# Patient Record
Sex: MALE | Race: WHITE | Employment: OTHER | URBAN - METROPOLITAN AREA
[De-identification: names, ages, dates, MRNs, and addresses within clinical notes are randomized per-mention and may not be internally consistent; named-entity substitution may affect disease eponyms.]

---

## 2019-09-12 ENCOUNTER — TRANSFERRED RECORDS (OUTPATIENT)
Dept: HEALTH INFORMATION MANAGEMENT | Facility: CLINIC | Age: 69
End: 2019-09-12

## 2019-09-12 ENCOUNTER — APPOINTMENT (OUTPATIENT)
Age: 69
Setting detail: DERMATOLOGY
End: 2019-09-14

## 2019-09-12 VITALS — WEIGHT: 179 LBS | HEIGHT: 72 IN | RESPIRATION RATE: 14 BRPM

## 2019-09-12 DIAGNOSIS — D485 NEOPLASM OF UNCERTAIN BEHAVIOR OF SKIN: ICD-10-CM

## 2019-09-12 DIAGNOSIS — L57.0 ACTINIC KERATOSIS: ICD-10-CM

## 2019-09-12 PROBLEM — D48.5 NEOPLASM OF UNCERTAIN BEHAVIOR OF SKIN: Status: ACTIVE | Noted: 2019-09-12

## 2019-09-12 PROCEDURE — OTHER PATHOLOGY BILLING: OTHER

## 2019-09-12 PROCEDURE — 99202 OFFICE O/P NEW SF 15 MIN: CPT | Mod: 25

## 2019-09-12 PROCEDURE — 11103 TANGNTL BX SKIN EA SEP/ADDL: CPT

## 2019-09-12 PROCEDURE — 17004 DESTROY PREMAL LESIONS 15/>: CPT

## 2019-09-12 PROCEDURE — OTHER COUNSELING: OTHER

## 2019-09-12 PROCEDURE — OTHER BIOPSY BY SHAVE METHOD: OTHER

## 2019-09-12 PROCEDURE — 11102 TANGNTL BX SKIN SINGLE LES: CPT | Mod: 59

## 2019-09-12 PROCEDURE — 88305 TISSUE EXAM BY PATHOLOGIST: CPT

## 2019-09-12 PROCEDURE — OTHER LIQUID NITROGEN: OTHER

## 2019-09-12 ASSESSMENT — LOCATION SIMPLE DESCRIPTION DERM
LOCATION SIMPLE: RIGHT FOREARM
LOCATION SIMPLE: LEFT CHEEK
LOCATION SIMPLE: RIGHT CHEEK
LOCATION SIMPLE: RIGHT TEMPLE
LOCATION SIMPLE: LEFT FOREARM
LOCATION SIMPLE: LEFT UPPER BACK
LOCATION SIMPLE: LEFT FOREHEAD
LOCATION SIMPLE: LEFT SCALP
LOCATION SIMPLE: RIGHT FOREHEAD
LOCATION SIMPLE: LEFT EAR

## 2019-09-12 ASSESSMENT — LOCATION DETAILED DESCRIPTION DERM
LOCATION DETAILED: LEFT INFERIOR UPPER BACK
LOCATION DETAILED: RIGHT SUPERIOR MEDIAL MALAR CHEEK
LOCATION DETAILED: LEFT SUPERIOR LATERAL MALAR CHEEK
LOCATION DETAILED: RIGHT LATERAL FOREHEAD
LOCATION DETAILED: LEFT SUPERIOR CRUS OF ANTIHELIX
LOCATION DETAILED: RIGHT MID TEMPLE
LOCATION DETAILED: RIGHT PROXIMAL DORSAL FOREARM
LOCATION DETAILED: LEFT INFERIOR LATERAL FOREHEAD
LOCATION DETAILED: RIGHT SUPERIOR TEMPLE
LOCATION DETAILED: RIGHT LATERAL MALAR CHEEK
LOCATION DETAILED: LEFT CENTRAL FRONTAL SCALP
LOCATION DETAILED: LEFT FOREHEAD
LOCATION DETAILED: LEFT SUPERIOR FOREHEAD
LOCATION DETAILED: LEFT PROXIMAL DORSAL FOREARM

## 2019-09-12 ASSESSMENT — LOCATION ZONE DERM
LOCATION ZONE: ARM
LOCATION ZONE: SCALP
LOCATION ZONE: TRUNK
LOCATION ZONE: FACE
LOCATION ZONE: EAR

## 2019-09-12 NOTE — PROCEDURE: BIOPSY BY SHAVE METHOD
Anesthesia Type: 2% lidocaine with epinephrine
Detail Level: Detailed
Render Path Notes In Note?: Yes
Post-Care Instructions: WOUND CARE:\\nDo NOT submerge wound in a bath, swimming pool, or hot tub for at least 1 week or for as long as there is an open wound. Gently cleanse the site daily with mild soap and water. Be careful NOT to allow a forceful stream of water to hit the biopsy site. After cleaning/showering, apply a thin layer of petrolatum ointment or Aquaphor in the wound followed by an adhesive bandage. Continue this process daily until healed. \\n\\nBLEEDING:\\nIf you develop persistent bleeding, apply firm and steady pressure over the dressing with gauze for 15 minutes. If bleeding persists, reapply pressure with an ice pack over the gauze for 15 minutes. NEVER APPLY ICE DIRECTLY TO THE SKIN. Do NOT peek under the gauze during these 15 minutes of pressure.  Call our office at [phone] if you cannot get the bleeding to stop. \\n\\nINFECTION:\\nSigns of infection may include increasing rather than decreasing pain (after the first few days), increasing redness/swelling/heat, draining pus, pink/red streaks around the wound, and fever or chills.  Please call our office immediately at [phone] if infection is suspected. It is normal to have some mild redness on or around the wound edges; this will lighten day by day and will become less tender.\\n\\nPAIN:\\nPain is usually minimal, but if needed you may take acetaminophen (Tylenol) every four hours as needed. Applying an ice pack over the dressing for 15-20 minutes every 2-3 hours will relieve swelling, lessen pain, and help minimize bruising. NEVER APPLY ICE DIRECTLY TO THE SKIN. Avoid ibuprofen (Advil, Motrin) and naproxen (Aleve) for the first 48 hours as these can increase bleeding.\\n\\nSWELLIG AND BRUISING:\\nSwelling and bruising are common and temporary, usually lasting 1 - 2 weeks. It is more common in areas treated around the eyes, hands, and feet. In the days following the procedure, swelling and bruising can be minimized by keeping the affected areas elevated when possible, reducing salty foods, and applying ice packs over the dressing for 15-20 minutes every 2-3 hours. NEVER APPLY ICE DIRECTLY TO THE SKIN.\\n\\nITCHING:\\nItchiness on and around the wound is very common and can last several days to weeks after surgery. Mild itch is normal as the wound is healing. \\n\\nNERVE CHANGES:\\nNumbness is usually temporary, but it may last for several weeks to months. You may also experience sharp pains at the wound sight as it heals.  Mild pain is normal and will gradually improve with time.\\n \\nNO SMOKING:\\nSmoking will delay the healing process. If you smoke, we recommend trying to quit or at minimum reduce how much you smoke following a procedure.
Billing Type: Client Bill
Notification Instructions: - It can take up to 2 weeks to get a biopsy result. \\n- Please refrain from calling to request results until after 2 weeks.
Destruction After The Procedure: No
Hemostasis: Drysol
Electrodesiccation Text: The wound bed was treated with electrodesiccation after the biopsy was performed.
X Size Of Lesion In Cm: 0
Consent: - Verbal and written consent was obtained and risks were reviewed prior to procedure today. \\n- Risks discussed include but are not limited to scarring, infection, bleeding, scabbing, incomplete removal, nerve damage, pain, and allergy to anesthesia.
Anesthesia Volume In Cc (Will Not Render If 0): 0.4
Depth Of Biopsy: dermis
Type Of Destruction Used: Curettage
Biopsy Type: H and E
Electrodesiccation And Curettage Text: The wound bed was treated with electrodesiccation and curettage after the biopsy was performed.
Cryotherapy Text: The wound bed was treated with cryotherapy after the biopsy was performed.
Silver Nitrate Text: The wound bed was treated with silver nitrate after the biopsy was performed.
Dressing: bandage
Wound Care: Petrolatum
Biopsy Method: Dermablade
Curettage Text: The wound bed was treated with curettage after the biopsy was performed.
Post-Care Instructions: WOUND CARE:\\nDo NOT submerge wound in a bath, swimming pool, or hot tub for at least 1 week or for as long as there is an open wound. Gently cleanse the site daily with mild soap and water. Be careful NOT to allow a forceful stream of water to hit the biopsy site. After cleaning/showering, apply a thin layer of petrolatum ointment or Aquaphor in the wound followed by an adhesive bandage. Continue this process daily until healed. \\n\\nBLEEDING:\\nIf you develop persistent bleeding, apply firm and steady pressure over the dressing with gauze for 15 minutes. If bleeding persists, reapply pressure with an ice pack over the gauze for 15 minutes. NEVER APPLY ICE DIRECTLY TO THE SKIN. Do NOT peek under the gauze during these 15 minutes of pressure.  Call our office at [phone] if you cannot get the bleeding to stop. \\n\\nINFECTION:\\nSigns of infection may include increasing rather than decreasing pain (after the first few days), increasing redness/swelling/heat, draining pus, pink/red streaks around the wound, and fever or chills.  Please call our office immediately at [phone] if infection is suspected. It is normal to have some mild redness on or around the wound edges; this will lighten day by day and will become less tender.\\n\\nPAIN:\\nPain is usually minimal, but if needed you may take acetaminophen (Tylenol) every four hours as needed. Applying an ice pack over the dressing for 15-20 minutes every 2-3 hours will relieve swelling, lessen pain, and help minimize bruising. NEVER APPLY ICE DIRECTLY TO THE SKIN. Avoid ibuprofen (Advil, Motrin) and naproxen (Aleve) for the first 48 hours as these can increase bleeding.\\n\\nSWELLIG AND BRUISING:\\nSwelling and bruising are common and temporary, usually lasting 1 - 2 weeks. It is more common in areas treated around the eyes, hands, and feet. In the days following the procedure, swelling and bruising can be minimized by keeping the affected areas elevated when possible, reducing salty foods, and applying ice packs over the dressing for 15-20 minutes every 2-3 hours. NEVER APPLY ICE DIRECTLY TO THE SKIN.\\n\\nITCHING:\\nItchiness on and around the wound is very common and can last several days to weeks after surgery. Mild itch is normal as the wound is healing. \\n\\nNERVE CHANGES:\\nNumbness is usually temporary, but it may last for several weeks to months. You may also experience sharp pains at the wound sight as it heals.  Mild pain is normal and will gradually improve with time.\\n \\nNO SMOKING:\\nSmoking will delay the healing process. If you smoke, we recommend trying to quit or at minimum reduce how much you smoke following a procedure.

## 2019-09-12 NOTE — PROCEDURE: COUNSELING
Patient Specific Counseling (Will Not Stick From Patient To Patient): Will make sure to recheck AKs on bilateral arms, may consider bx at next OV.
Detail Level: Detailed

## 2019-09-12 NOTE — PROCEDURE: PATHOLOGY BILLING
Immunohistochemistry (62489 and [id number]) billing is not performed here. Please use the Immunohistochemistry Stain Billing plan to accomplish this. Immunohistochemistry ([id number] and [id number]) billing is not performed here. Please use the Immunohistochemistry Stain Billing plan to accomplish this.

## 2019-09-12 NOTE — PROCEDURE: LIQUID NITROGEN
Detail Level: Detailed
Render Note In Bullet Format When Appropriate: No
Post-Care Instructions: I reviewed with the patient in detail post-care instructions. Patient is to wear 30 spf sun protection, and avoid picking at any of the treated lesions. Pt may apply Petrolatum to crusted or scabbing areas.
Consent: The patient's consent was obtained including but not limited to risks of crusting, scabbing, blistering, scarring, darker or lighter pigmentary change, recurrence, incomplete removal and infection.
Duration Of Freeze Thaw-Cycle (Seconds): 0

## 2019-10-09 ENCOUNTER — APPOINTMENT (OUTPATIENT)
Age: 69
Setting detail: DERMATOLOGY
End: 2019-10-13

## 2019-10-09 VITALS — HEIGHT: 73 IN | WEIGHT: 179 LBS | RESPIRATION RATE: 16 BRPM

## 2019-10-09 PROBLEM — C44.519 BASAL CELL CARCINOMA OF SKIN OF OTHER PART OF TRUNK: Status: ACTIVE | Noted: 2019-10-09

## 2019-10-09 PROCEDURE — 12034 INTMD RPR S/TR/EXT 7.6-12.5: CPT

## 2019-10-09 PROCEDURE — 88305 TISSUE EXAM BY PATHOLOGIST: CPT

## 2019-10-09 PROCEDURE — OTHER COUNSELING: OTHER

## 2019-10-09 PROCEDURE — OTHER PATHOLOGY BILLING: OTHER

## 2019-10-09 PROCEDURE — 11603 EXC TR-EXT MAL+MARG 2.1-3 CM: CPT

## 2019-10-09 PROCEDURE — OTHER EXCISION: OTHER

## 2019-10-09 NOTE — PROCEDURE: EXCISION
Home Suture Removal Text: - Patient/caregiver was provided with a sterile #11 blade and given verbal instructions for suture removal. \\n- If they have any questions, difficulties, or decide they would like the sutures removed in office,  then they will call [facility].

## 2019-10-09 NOTE — PROCEDURE: PATHOLOGY BILLING
Rendering Text In Billing: The slides will be read by [employer] and reported in the attached document. Rendering Text In Billing: The slides will be read by [employer] and reported in the attached document.

## 2019-10-09 NOTE — PROCEDURE: PATHOLOGY BILLING
Immunohistochemistry ([id number] and [id number]) billing is not performed here. Please use the Immunohistochemistry Stain Billing plan to accomplish this. Immunohistochemistry ([id number] and 38272) billing is not performed here. Please use the Immunohistochemistry Stain Billing plan to accomplish this.

## 2019-10-23 ENCOUNTER — APPOINTMENT (OUTPATIENT)
Age: 69
Setting detail: DERMATOLOGY
End: 2019-10-23

## 2019-10-23 DIAGNOSIS — Z48.02 ENCOUNTER FOR REMOVAL OF SUTURES: ICD-10-CM

## 2019-10-23 PROCEDURE — OTHER SUTURE REMOVAL (GLOBAL PERIOD): OTHER

## 2019-10-23 ASSESSMENT — LOCATION DETAILED DESCRIPTION DERM: LOCATION DETAILED: LEFT INFERIOR UPPER BACK

## 2019-10-23 ASSESSMENT — LOCATION ZONE DERM: LOCATION ZONE: TRUNK

## 2019-10-23 ASSESSMENT — LOCATION SIMPLE DESCRIPTION DERM: LOCATION SIMPLE: LEFT UPPER BACK

## 2019-10-23 NOTE — PROCEDURE: SUTURE REMOVAL (GLOBAL PERIOD)
Add 82993 Cpt? (Important Note: In 2017 The Use Of 81044 Is Being Tracked By Cms To Determine Future Global Period Reimbursement For Global Periods): no
Detail Level: Detailed

## 2019-11-19 ENCOUNTER — APPOINTMENT (OUTPATIENT)
Age: 69
Setting detail: DERMATOLOGY
End: 2019-11-19

## 2019-11-19 ENCOUNTER — TRANSFERRED RECORDS (OUTPATIENT)
Dept: HEALTH INFORMATION MANAGEMENT | Facility: CLINIC | Age: 69
End: 2019-11-19

## 2019-11-19 VITALS — SYSTOLIC BLOOD PRESSURE: 119 MMHG | HEART RATE: 73 BPM | DIASTOLIC BLOOD PRESSURE: 77 MMHG

## 2019-11-19 PROBLEM — C44.319 BASAL CELL CARCINOMA OF SKIN OF OTHER PARTS OF FACE: Status: ACTIVE | Noted: 2019-11-19

## 2019-11-19 PROCEDURE — 17311 MOHS 1 STAGE H/N/HF/G: CPT

## 2019-11-19 PROCEDURE — 17312 MOHS ADDL STAGE: CPT

## 2019-11-19 PROCEDURE — OTHER MOHS SURGERY: OTHER

## 2019-11-19 NOTE — PROCEDURE: MOHS SURGERY
Body Location Override (Optional - Billing Will Still Be Based On Selected Body Map Location If Applicable): right medial superior malar cheek

## 2019-11-19 NOTE — PROCEDURE: MOHS SURGERY
LAST APPT 8/2/18  NEXT APPT 8/30/18 Bilobed Transposition Flap Text: The defect edges were debeveled with a #15 scalpel blade.  Given the location of the defect and the proximity to free margins a bilobed transposition flap was deemed most appropriate.  Using a sterile surgical marker, an appropriate bilobe flap drawn around the defect.    The area thus outlined was incised deep to adipose tissue with a #15 scalpel blade.  The skin margins were undermined to an appropriate distance in all directions utilizing iris scissors.

## 2019-11-20 ENCOUNTER — OFFICE VISIT (OUTPATIENT)
Dept: OPHTHALMOLOGY | Facility: CLINIC | Age: 69
End: 2019-11-20
Payer: COMMERCIAL

## 2019-11-20 DIAGNOSIS — C44.111 BASAL CELL CARCINOMA (BCC) OF SKIN OF RIGHT EYELID INCLUDING CANTHUS, UNSPECIFIED EYELID: Primary | ICD-10-CM

## 2019-11-20 PROBLEM — N52.9 IMPOTENCE OF ORGANIC ORIGIN: Status: ACTIVE | Noted: 2019-01-08

## 2019-11-20 PROBLEM — R17 ELEVATED BILIRUBIN: Status: ACTIVE | Noted: 2019-01-08

## 2019-11-20 PROBLEM — R35.1 NOCTURIA: Status: ACTIVE | Noted: 2019-01-08

## 2019-11-20 PROBLEM — L82.1 OTHER SEBORRHEIC KERATOSIS: Status: ACTIVE | Noted: 2019-01-08

## 2019-11-20 PROBLEM — H91.90 HEARING LOSS: Status: ACTIVE | Noted: 2019-01-08

## 2019-11-20 PROBLEM — K13.70 OTHER AND UNSPECIFIED DISEASES OF THE ORAL SOFT TISSUES: Status: ACTIVE | Noted: 2019-01-08

## 2019-11-20 PROBLEM — M50.30 DEGENERATIVE DISC DISEASE, CERVICAL: Status: ACTIVE | Noted: 2019-01-08

## 2019-11-20 PROBLEM — M77.10 LATERAL EPICONDYLITIS OF ELBOW: Status: ACTIVE | Noted: 2019-01-08

## 2019-11-20 PROBLEM — N50.9 DISORDER OF MALE GENITAL ORGANS: Status: ACTIVE | Noted: 2019-01-08

## 2019-11-20 PROBLEM — H61.23 BILATERAL IMPACTED CERUMEN: Status: ACTIVE | Noted: 2019-01-08

## 2019-11-20 PROBLEM — Z82.49 FAMILY HISTORY OF CARDIOVASCULAR DISEASE: Status: ACTIVE | Noted: 2019-01-08

## 2019-11-20 PROBLEM — E78.00 PURE HYPERCHOLESTEROLEMIA: Status: ACTIVE | Noted: 2017-11-13

## 2019-11-20 PROBLEM — K11.7 INCREASED SALIVATION: Status: ACTIVE | Noted: 2019-01-08

## 2019-11-20 PROBLEM — R21 RASH: Status: ACTIVE | Noted: 2019-01-08

## 2019-11-20 PROBLEM — M81.0 OSTEOPOROSIS: Status: ACTIVE | Noted: 2019-01-08

## 2019-11-20 PROBLEM — E55.9 VITAMIN D DEFICIENCY: Status: ACTIVE | Noted: 2017-11-13

## 2019-11-20 PROBLEM — B35.1 ONYCHOMYCOSIS: Status: ACTIVE | Noted: 2019-01-08

## 2019-11-20 PROBLEM — I25.10 CARDIOVASCULAR DISEASE: Status: ACTIVE | Noted: 2019-01-08

## 2019-11-20 PROBLEM — R29.91: Status: ACTIVE | Noted: 2019-01-08

## 2019-11-20 PROBLEM — R29.90: Status: ACTIVE | Noted: 2019-01-08

## 2019-11-20 PROBLEM — H61.21 IMPACTED CERUMEN OF RIGHT EAR: Status: ACTIVE | Noted: 2019-01-08

## 2019-11-20 PROBLEM — H90.3 SENSORINEURAL HEARING LOSS, BILATERAL: Status: ACTIVE | Noted: 2018-01-02

## 2019-11-20 PROBLEM — L98.9 DISORDER OF SKIN OR SUBCUTANEOUS TISSUE: Status: ACTIVE | Noted: 2019-01-08

## 2019-11-20 PROBLEM — G20.A1 PARKINSON'S DISEASE (H): Status: ACTIVE | Noted: 2017-11-13

## 2019-11-20 PROBLEM — N40.0 BENIGN PROSTATIC HYPERPLASIA: Status: ACTIVE | Noted: 2018-01-02

## 2019-11-20 PROBLEM — I83.90 ASYMPTOMATIC VARICOSE VEINS: Status: ACTIVE | Noted: 2019-01-08

## 2019-11-20 PROBLEM — R61 GENERALIZED HYPERHIDROSIS: Status: ACTIVE | Noted: 2019-01-08

## 2019-11-20 PROBLEM — L25.9 CONTACT DERMATITIS AND OTHER ECZEMA: Status: ACTIVE | Noted: 2019-01-08

## 2019-11-20 PROBLEM — R93.1 AGATSTON CORONARY ARTERY CALCIUM SCORE GREATER THAN 400: Status: ACTIVE | Noted: 2019-01-08

## 2019-11-20 PROCEDURE — 14060 TIS TRNFR E/N/E/L 10 SQ CM/<: CPT | Performed by: OPHTHALMOLOGY

## 2019-11-20 PROCEDURE — 99203 OFFICE O/P NEW LOW 30 MIN: CPT | Mod: 57 | Performed by: OPHTHALMOLOGY

## 2019-11-20 PROCEDURE — 21282 LATERAL CANTHOPEXY: CPT | Mod: 51 | Performed by: OPHTHALMOLOGY

## 2019-11-20 PROCEDURE — 92285 EXTERNAL OCULAR PHOTOGRAPHY: CPT | Performed by: OPHTHALMOLOGY

## 2019-11-20 RX ORDER — ATORVASTATIN CALCIUM 80 MG/1
80 TABLET, FILM COATED ORAL DAILY
Refills: 3 | COMMUNITY
Start: 2019-09-08 | End: 2021-09-07

## 2019-11-20 RX ORDER — ERYTHROMYCIN 5 MG/G
OINTMENT OPHTHALMIC
Qty: 1 TUBE | Refills: 1 | Status: SHIPPED | OUTPATIENT
Start: 2019-11-20 | End: 2019-12-04

## 2019-11-20 RX ORDER — CARBIDOPA AND LEVODOPA 25; 100 MG/1; MG/1
1 TABLET ORAL 3 TIMES DAILY
Refills: 3 | COMMUNITY
Start: 2019-11-08

## 2019-11-20 RX ORDER — ASPIRIN 81 MG/1
TABLET ORAL
COMMUNITY
Start: 2016-10-24 | End: 2021-09-07

## 2019-11-20 RX ORDER — TAMSULOSIN HYDROCHLORIDE 0.4 MG/1
0.4 CAPSULE ORAL DAILY
Refills: 0 | COMMUNITY
Start: 2019-11-18

## 2019-11-20 RX ORDER — INFLUENZA A VIRUS A/VICTORIA/4897/2022 IVR-238 (H1N1) ANTIGEN (FORMALDEHYDE INACTIVATED), INFLUENZA A VIRUS A/CALIFORNIA/122/2022 SAN-022 (H3N2) ANTIGEN (FORMALDEHYDE INACTIVATED), AND INFLUENZA B VIRUS B/MICHIGAN/01/2021 ANTIGEN (FORMALDEHYDE INACTIVATED) 60; 60; 60 UG/.5ML; UG/.5ML; UG/.5ML
INJECTION, SUSPENSION INTRAMUSCULAR
Refills: 0 | COMMUNITY
Start: 2019-09-18 | End: 2021-09-07

## 2019-11-20 RX ORDER — ALENDRONATE SODIUM 70 MG/1
TABLET ORAL
Refills: 3 | COMMUNITY
Start: 2019-10-22 | End: 2021-09-07

## 2019-11-20 RX ORDER — LEVOTHYROXINE SODIUM 50 UG/1
50 TABLET ORAL DAILY
COMMUNITY
Start: 2019-09-10

## 2019-11-20 RX ORDER — GLYCOPYRROLATE 1 MG/1
TABLET ORAL
Refills: 3 | COMMUNITY
Start: 2019-10-22 | End: 2021-09-07

## 2019-11-20 RX ORDER — VITAMIN E 268 MG
400 CAPSULE ORAL DAILY
COMMUNITY
End: 2021-09-28

## 2019-11-20 RX ORDER — RASAGILINE 1 MG/1
0.5 TABLET ORAL DAILY
Refills: 2 | COMMUNITY
Start: 2019-11-15

## 2019-11-20 RX ORDER — MOXIFLOXACIN 5 MG/ML
1 SOLUTION/ DROPS OPHTHALMIC
COMMUNITY
Start: 2010-10-13 | End: 2021-09-07

## 2019-11-20 ASSESSMENT — TONOMETRY
OS_IOP_MMHG: 12
IOP_METHOD: ICARE
OD_IOP_MMHG: 12

## 2019-11-20 ASSESSMENT — VISUAL ACUITY
OS_SC: 20/40
OD_SC: 20/50
OD_SC+: +2
OS_SC+: -2
METHOD: SNELLEN - LINEAR

## 2019-11-20 ASSESSMENT — CONF VISUAL FIELD
OS_NORMAL: 1
OD_NORMAL: 1

## 2019-11-20 ASSESSMENT — SLIT LAMP EXAM - LIDS
COMMENTS: NORMAL
COMMENTS: NORMAL

## 2019-11-20 NOTE — NURSING NOTE
Chief Complaints and History of Present Illnesses   Patient presents with     Eye Problem Left Eye       Chief Complaint(s) and History of Present Illness(es)     Eye Problem Left Eye     Laterality: right eye    Pain scale: 0/10              Comments     Right canthal left lower Mohs 11/19/2019 referred by Dr. Anson Santa. Prior to biopsy, spot looked like a red spot - had 2 biopsies, one on nose and one closer to eye. No pain yesterday or today. No swelling or drainage from biopsy spot. No vision changes to note.                Melanie Jeans, OA

## 2019-11-20 NOTE — PROGRESS NOTES
Chief Complaint(s) and History of Present Illness(es)     Eye Problem Left Eye     Laterality: right eye    Pain scale: 0/10              Comments     Right canthal left lower Mohs 11/19/2019 referred by Dr. Anson Santa.   Prior to biopsy, spot looked like a red spot - had 2 biopsies, one on nose   and one closer to eye. No pain yesterday or today. No swelling or drainage   from biopsy spot. No vision changes to note.          Underwent Mohs yesterday with Dr. Santa, referred for reconstruction.       Assessment & Plan     Abraham Sinclair Jr. is a 69 year old male with the following diagnoses:   No diagnosis found.     3 x 4 cm Right lower lid cheek nose junction defect.  Plan: Local advancement flap and lateral canthopexy to prevent eyelid retraction    Offered under sedation or in clinic, would like done in clinic. Can arrange today.         Attending Physician Attestation:  Complete documentation of historical and exam elements from today's encounter can be found in the full encounter summary report (not reduplicated in this progress note).  I personally obtained the chief complaint(s) and history of present illness.  I confirmed and edited as necessary the review of systems, past medical/surgical history, family history, social history, and examination findings as documented by others; and I examined the patient myself.  I personally reviewed the relevant tests, images, and reports as documented above.  I formulated and edited as necessary the assessment and plan and discussed the findings and management plan with the patient and family. - Fede Nieves MD    PREOPERATIVE DIAGNOSIS: Right  lower eyelid defect following Mohs resection of a basal cell carcinoma.  POSTOPERATIVE DIAGNOSIS: Right lower eyelid defect following Mohs resection of a basal cell carcinoma.   PROCEDURE PERFORMED: Rightt lower eyelid reconstruction with local flaps and lateral canthopexy.   SURGEON: Fede Nieves MD.    ASSISTANTS: None  ANESTHESIA: 1% lidocaine with epinephrine    COMPLICATIONS: None.   ESTIMATED BLOOD LOSS: Less than 5 mL.   HISTORY: Abraham Sinclair Jr.  presented with a lower eyelid defect following Mohs resection of a basal cell carcinoma carcinoma. After the risks, benefits and alternatives to the proposed procedure were explained, informed consent was obtained.   DESCRIPTION OF PROCEDURE: Abraham Sinclair Jr.  was brought to the operating room and placed supine on the table.  The lower lid and surrounding areas were infiltrated with local anesthetic mixture. The area was prepped and draped in the typical sterile oculoplastic fashion. Attention was directed to the area of the defect. Advancement flaps was drawn with a marking pen and these lines were incised with a #15 blade. The flap was widely undermined. The lateral canthus was supported with a 5-0 Vicryl suture securing lateral tarsus to the lateral orbital rim periosteum in a double arm fashion. The flap was then secured together with interrupted 5-0 vicryl sutures deep and running 6-0 plain gut sutures. The final defect size was 4 x 5 cm. The patient tolerated the procedure well and the antibiotic ointment was applied to the incisions. Abraham Sinclair Jr.  left the room in stable condition.   Fede Nieves MD

## 2019-11-20 NOTE — LETTER
2019         RE:  :  MRN: Abraham Sinclair Jr.  1950  [id number]     Dear Dr. Anson Santa,    Thank you for asking me to see your patient, Abraham Sinclair Jr., for an oculoplastic   consultation.  My assessment and plan are below.  For further details, please see my attached clinic note.      Chief Complaint(s) and History of Present Illness(es)     Eye Problem Left Eye     Laterality: right eye    Pain scale: 0/10              Comments     Right canthal left lower Mohs 2019 referred by Dr. Anson Santa.   Prior to biopsy, spot looked like a red spot - had 2 biopsies, one on nose   and one closer to eye. No pain yesterday or today. No swelling or drainage   from biopsy spot. No vision changes to note.          Underwent Mohs yesterday with Dr. Santa, referred for reconstruction.       Assessment & Plan     Abraham Sinclair Jr. is a 69 year old male with the following diagnoses:   No diagnosis found.     3 x 4 cm Right lower lid cheek nose junction defect.  Plan: Local advancement flap and lateral canthopexy to prevent eyelid retraction    Offered under sedation or in clinic, would like done in clinic. Can arrange today.        Again, thank you for allowing me to participate in the care of your patient.      Sincerely,    Fede Nieves MD  Department of Ophthalmology and Visual Neurosciences  [facility]    CC: Anson Santa MD  [facility]  [address]  [facility] [address]  VIA Outside Provider Messaging

## 2019-11-20 NOTE — PATIENT INSTRUCTIONS
Post-operative Instructions  Ophthalmic Plastic and Reconstructive Surgery    Fede Nieves M.D.     All instructions apply to the operated eye(s) or eyelid(s).    Wound care and personal care  ? If a patch or bandage has been placed, please leave this in place until seen by your physician. Ensure that the bandage does not get wet when you take a shower.  ? Apply ice compresses 15 minutes of every hour while awake for 2 days. As long as there is no further bleeding, after two days, switch to warm water compresses for five minutes, four times a day until seen by your physician.   ? You may shower or wash your hair the day after surgery. Do not go swimming for at least 2 weeks to prevent contamination of your wounds.  ? Do not apply make-up to the eyes or eyelids for 2 weeks after surgery.  ? Expect some swelling, bruising, black eye (even into the lower eyelids and cheeks). Also expect serum caking, crusting and discharge from the eye and/or incisions. A small amount of surface bleeding, and depending on the type of surgery, bleeding from the inside of the eyelid, is normal for the first 48 hours.  ? Avoid straining, bending at the waist, or lifting more than 15 pounds for 10 days. Activities that raise your blood pressure can worsen swelling, cause bleeding, and breaking of sutures. Like wise, sleeping with your head slightly elevated for the first several days can help swelling resolve more quickly.   ? Do continue to ambulate (walk) as you normally would - being sedentary after surgery can cause blood clots.   ? Your eye(s) and eyelid(s) may be painful and tender. This is normal after surgery.      Contact information and follow-up  ? Return to the Eye Clinic for a follow-up appointment with your physician as scheduled. If no appointment has been scheduled:   - [facility] eye clinic: [phone] for an appointment with Dr. Nieves within 1 to 2 weeks from your date of surgery.   -  [facility]  [place] eye clinic: [phone] for an appointment with Dr. Nieves within 1 to 2 weeks from your date of surgery.     ? For severe pain, bleeding, or loss of vision, call the [facility] at [phone] or [facility] at [phone].     After hours or on weekends and holidays, call [phone] and ask to speak with the ophthalmologist on call.    An on call person can be reached after hours for concerns. The on call doctor should not call in medication refill requests after hours or on weekends, so please plan accordingly. An effort has been made to provide adequate pain medications following every surgery, and refills will not be provided in most instances. Narcotic pain medications cannot be called in.     Activity restrictions and driving  ? Avoid heavy lifting, bending, exercise or strenuous activity for 1 week after surgery.  You may resume other activities and return to work as tolerated.  ? You may not resume driving if you are using narcotic pain medications (such as Norco, Percocet, Tylenol #3).    Medications  ? Restart all regular home medications and eye drops. If you take Plavix or  Aspirin on a regular basis, wait for 72 hours after your surgery before restarting these in order to decrease the risk of bleeding complications.  ? Avoid aspirin and aspirin-like medications (Motrin, Aleve, Ibuprofen, Tri-Gl[place] etc) for 72 hours to reduce the risk of bleeding. You may take Tylenol (acetaminophen) for pain.  ? In addition to your home medications, take the following post-operative medications as prescribed by your physician.    ? Apply antibiotic ointment to all sutures three times a day. Once you run out you can apply Vaseline to the area.

## 2019-12-04 ENCOUNTER — OFFICE VISIT (OUTPATIENT)
Dept: OPHTHALMOLOGY | Facility: CLINIC | Age: 69
End: 2019-12-04
Payer: COMMERCIAL

## 2019-12-04 DIAGNOSIS — C44.111 BASAL CELL CARCINOMA (BCC) OF SKIN OF RIGHT EYELID INCLUDING CANTHUS, UNSPECIFIED EYELID: Primary | ICD-10-CM

## 2019-12-04 PROCEDURE — 99024 POSTOP FOLLOW-UP VISIT: CPT | Performed by: OPHTHALMOLOGY

## 2019-12-04 ASSESSMENT — TONOMETRY
OD_IOP_MMHG: 12
OS_IOP_MMHG: 9
IOP_METHOD: ICARE

## 2019-12-04 NOTE — PROGRESS NOTES
Chief Complaint(s) and History of Present Illness(es)     Post Op (Ophthalmology) Right Eye               Comments     S/P right lower eyelid Mohs reconstruction 11/20/2019. Patient states eye   feels better and better every day. No pain or irritation. Yesterday was   last day of using ointment.          Assessment & Plan     Abraham Sinclair Jr. is a 69 year old male with the following diagnoses:   1. Basal cell carcinoma (BCC) of skin of right eyelid including canthus, unspecified eyelid       Looks great  Warm compresses  Emanuel and upwards massage  F/u as needed.   Has dermatology follow up with [facility] and sees Dr. Gregorio for eye care.          Attending Physician Attestation:  Complete documentation of historical and exam elements from today's encounter can be found in the full encounter summary report (not reduplicated in this progress note).  I personally obtained the chief complaint(s) and history of present illness.  I confirmed and edited as necessary the review of systems, past medical/surgical history, family history, social history, and examination findings as documented by others; and I examined the patient myself.  I personally reviewed the relevant tests, images, and reports as documented above.  I formulated and edited as necessary the assessment and plan and discussed the findings and management plan with the patient and family. - Fede Nieves MD

## 2019-12-04 NOTE — NURSING NOTE
Chief Complaints and History of Present Illnesses   Patient presents with     Post Op (Ophthalmology) Right Eye       Chief Complaint(s) and History of Present Illness(es)     Post Op (Ophthalmology) Right Eye               Comments     S/P right lower eyelid Mohs reconstruction 11/20/2019. Patient states eye feels better and better every day. No pain or irritation. Yesterday was last day of using ointment.                Melanie Jeans, OA

## 2020-06-09 ENCOUNTER — APPOINTMENT (OUTPATIENT)
Age: 70
Setting detail: DERMATOLOGY
End: 2020-06-11

## 2020-06-09 VITALS — WEIGHT: 173 LBS | HEIGHT: 60 IN | RESPIRATION RATE: 15 BRPM

## 2020-06-09 DIAGNOSIS — Z85.828 PERSONAL HISTORY OF OTHER MALIGNANT NEOPLASM OF SKIN: ICD-10-CM

## 2020-06-09 DIAGNOSIS — D18.0 HEMANGIOMA: ICD-10-CM

## 2020-06-09 DIAGNOSIS — L57.0 ACTINIC KERATOSIS: ICD-10-CM

## 2020-06-09 DIAGNOSIS — L90.5 SCAR CONDITIONS AND FIBROSIS OF SKIN: ICD-10-CM

## 2020-06-09 DIAGNOSIS — I83.9 ASYMPTOMATIC VARICOSE VEINS OF LOWER EXTREMITIES: ICD-10-CM

## 2020-06-09 DIAGNOSIS — L82.1 OTHER SEBORRHEIC KERATOSIS: ICD-10-CM

## 2020-06-09 DIAGNOSIS — L81.4 OTHER MELANIN HYPERPIGMENTATION: ICD-10-CM

## 2020-06-09 DIAGNOSIS — B35.1 TINEA UNGUIUM: ICD-10-CM

## 2020-06-09 PROBLEM — I83.93 ASYMPTOMATIC VARICOSE VEINS OF BILATERAL LOWER EXTREMITIES: Status: ACTIVE | Noted: 2020-06-09

## 2020-06-09 PROBLEM — D18.01 HEMANGIOMA OF SKIN AND SUBCUTANEOUS TISSUE: Status: ACTIVE | Noted: 2020-06-09

## 2020-06-09 PROCEDURE — OTHER COUNSELING: OTHER

## 2020-06-09 PROCEDURE — 99214 OFFICE O/P EST MOD 30 MIN: CPT | Mod: 25

## 2020-06-09 PROCEDURE — OTHER LIQUID NITROGEN: OTHER

## 2020-06-09 PROCEDURE — OTHER PRESCRIPTION: OTHER

## 2020-06-09 PROCEDURE — 17000 DESTRUCT PREMALG LESION: CPT

## 2020-06-09 PROCEDURE — 17003 DESTRUCT PREMALG LES 2-14: CPT

## 2020-06-09 PROCEDURE — OTHER ADDITIONAL NOTES: OTHER

## 2020-06-09 RX ORDER — FLUOROURACIL 2 G/40G
5% CREAM TOPICAL BID
Qty: 1 | Refills: 0 | Status: ERX | COMMUNITY
Start: 2020-06-09

## 2020-06-09 ASSESSMENT — LOCATION ZONE DERM
LOCATION ZONE: FACE
LOCATION ZONE: ARM
LOCATION ZONE: LEG
LOCATION ZONE: TOE
LOCATION ZONE: TRUNK

## 2020-06-09 ASSESSMENT — LOCATION DETAILED DESCRIPTION DERM
LOCATION DETAILED: LEFT POSTERIOR SHOULDER
LOCATION DETAILED: LEFT FOREHEAD
LOCATION DETAILED: RIGHT POSTERIOR SHOULDER
LOCATION DETAILED: RIGHT INFERIOR UPPER BACK
LOCATION DETAILED: RIGHT PROXIMAL DORSAL FOREARM
LOCATION DETAILED: LEFT PROXIMAL CALF
LOCATION DETAILED: RIGHT SUPERIOR MEDIAL UPPER BACK
LOCATION DETAILED: RIGHT CENTRAL TEMPLE
LOCATION DETAILED: LEFT SUPERIOR UPPER BACK
LOCATION DETAILED: RIGHT PROXIMAL PRETIBIAL REGION
LOCATION DETAILED: LEFT PROXIMAL DORSAL FOREARM
LOCATION DETAILED: RIGHT DISTAL PLANTAR GREAT TOE
LOCATION DETAILED: RIGHT LATERAL FOREHEAD
LOCATION DETAILED: RIGHT INFERIOR CENTRAL MALAR CHEEK
LOCATION DETAILED: RIGHT DISTAL CALF
LOCATION DETAILED: STERNAL NOTCH
LOCATION DETAILED: LEFT DISTAL PRETIBIAL REGION
LOCATION DETAILED: RIGHT SUPERIOR MEDIAL FOREHEAD
LOCATION DETAILED: LEFT INFERIOR FOREHEAD
LOCATION DETAILED: RIGHT CENTRAL MALAR CHEEK
LOCATION DETAILED: LEFT MID-UPPER BACK

## 2020-06-09 ASSESSMENT — LOCATION SIMPLE DESCRIPTION DERM
LOCATION SIMPLE: LEFT UPPER BACK
LOCATION SIMPLE: CHEST
LOCATION SIMPLE: LEFT SHOULDER
LOCATION SIMPLE: RIGHT CHEEK
LOCATION SIMPLE: LEFT FOREARM
LOCATION SIMPLE: LEFT CALF
LOCATION SIMPLE: RIGHT CALF
LOCATION SIMPLE: LEFT FOREHEAD
LOCATION SIMPLE: RIGHT SHOULDER
LOCATION SIMPLE: RIGHT PRETIBIAL REGION
LOCATION SIMPLE: RIGHT GREAT TOE
LOCATION SIMPLE: LEFT PRETIBIAL REGION
LOCATION SIMPLE: RIGHT TEMPLE
LOCATION SIMPLE: RIGHT FOREHEAD
LOCATION SIMPLE: RIGHT UPPER BACK
LOCATION SIMPLE: RIGHT FOREARM

## 2020-06-09 NOTE — PROCEDURE: LIQUID NITROGEN
Consent: - Discussed that these are a result of cumulative sun exposure.\\n- Verbal and written consent was obtained, and risks were reviewed prior to procedure today. \\n- Risks discussed include but are not limited to pain, crusting, scabbing, blistering, scarring, temporary or permanent darker or lighter pigmentary change, recurrence, incomplete resolution, and infection.
Render Note In Bullet Format When Appropriate: Yes
Post-Care Instructions: - Patient was instructed to avoid picking at any of the treated lesions.
Detail Level: Detailed
Duration Of Freeze Thaw-Cycle (Seconds): 0

## 2020-06-09 NOTE — PROCEDURE: ADDITIONAL NOTES
Detail Level: Simple
Additional Notes: Right forehead right forehead are locations to watch at next office visit.

## 2020-07-21 ENCOUNTER — APPOINTMENT (OUTPATIENT)
Age: 70
Setting detail: DERMATOLOGY
End: 2020-07-27

## 2020-07-21 VITALS — HEIGHT: 60 IN | WEIGHT: 170 LBS | RESPIRATION RATE: 16 BRPM

## 2020-07-21 DIAGNOSIS — L57.0 ACTINIC KERATOSIS: ICD-10-CM

## 2020-07-21 PROCEDURE — OTHER COUNSELING: OTHER

## 2020-07-21 PROCEDURE — 99213 OFFICE O/P EST LOW 20 MIN: CPT

## 2020-07-21 ASSESSMENT — LOCATION SIMPLE DESCRIPTION DERM
LOCATION SIMPLE: LEFT EAR
LOCATION SIMPLE: RIGHT EAR
LOCATION SIMPLE: RIGHT CHEEK
LOCATION SIMPLE: LEFT CHEEK

## 2020-07-21 ASSESSMENT — LOCATION DETAILED DESCRIPTION DERM
LOCATION DETAILED: RIGHT SUPERIOR PREAURICULAR CHEEK
LOCATION DETAILED: LEFT SUPERIOR HELIX
LOCATION DETAILED: LEFT SUPERIOR PREAURICULAR CHEEK
LOCATION DETAILED: RIGHT SUPERIOR HELIX

## 2020-07-21 ASSESSMENT — LOCATION ZONE DERM
LOCATION ZONE: EAR
LOCATION ZONE: FACE

## 2020-07-21 NOTE — PROCEDURE: COUNSELING
Patient Specific Counseling (Will Not Stick From Patient To Patient): He has efudex cream at home and will use now to his preauricular areas and ears twice daily for 2 weeks
Detail Level: Simple

## 2020-10-12 NOTE — PROCEDURE: EXCISION
Detail Level: Detailed Double O-Z Plasty Text: The defect edges were debeveled with a #15 scalpel blade.  Given the location of the defect, shape of the defect and the proximity to free margins a Double O-Z plasty (double transposition flap) was deemed most appropriate.  Using a sterile surgical marker, the appropriate transposition flaps were drawn incorporating the defect and placing the expected incisions within the relaxed skin tension lines where possible. The area thus outlined was incised deep to adipose tissue with a #15 scalpel blade.  The skin margins were undermined to an appropriate distance in all directions utilizing iris scissors.  Hemostasis was achieved with electrocautery.  The flaps were then transposed into place, one clockwise and the other counterclockwise, and anchored with interrupted buried subcutaneous sutures.

## 2020-12-14 ENCOUNTER — APPOINTMENT (OUTPATIENT)
Dept: URBAN - METROPOLITAN AREA CLINIC 252 | Age: 70
Setting detail: DERMATOLOGY
End: 2020-12-16

## 2020-12-14 VITALS — WEIGHT: 165 LBS | HEIGHT: 73 IN | RESPIRATION RATE: 16 BRPM

## 2020-12-14 DIAGNOSIS — L82.1 OTHER SEBORRHEIC KERATOSIS: ICD-10-CM

## 2020-12-14 DIAGNOSIS — L57.0 ACTINIC KERATOSIS: ICD-10-CM

## 2020-12-14 DIAGNOSIS — L85.3 XEROSIS CUTIS: ICD-10-CM

## 2020-12-14 DIAGNOSIS — Z85.828 PERSONAL HISTORY OF OTHER MALIGNANT NEOPLASM OF SKIN: ICD-10-CM

## 2020-12-14 PROCEDURE — 99214 OFFICE O/P EST MOD 30 MIN: CPT | Mod: 25

## 2020-12-14 PROCEDURE — 17003 DESTRUCT PREMALG LES 2-14: CPT

## 2020-12-14 PROCEDURE — 17000 DESTRUCT PREMALG LESION: CPT

## 2020-12-14 PROCEDURE — OTHER ADDITIONAL NOTES: OTHER

## 2020-12-14 PROCEDURE — OTHER LIQUID NITROGEN: OTHER

## 2020-12-14 PROCEDURE — OTHER COUNSELING: OTHER

## 2020-12-14 ASSESSMENT — LOCATION DETAILED DESCRIPTION DERM
LOCATION DETAILED: RIGHT SUPERIOR MEDIAL UPPER BACK
LOCATION DETAILED: RIGHT CENTRAL MALAR CHEEK
LOCATION DETAILED: RIGHT LATERAL FOREHEAD
LOCATION DETAILED: RIGHT INFERIOR UPPER BACK
LOCATION DETAILED: RIGHT INFERIOR MEDIAL UPPER BACK

## 2020-12-14 ASSESSMENT — LOCATION SIMPLE DESCRIPTION DERM
LOCATION SIMPLE: RIGHT UPPER BACK
LOCATION SIMPLE: RIGHT CHEEK
LOCATION SIMPLE: RIGHT FOREHEAD

## 2020-12-14 ASSESSMENT — LOCATION ZONE DERM
LOCATION ZONE: TRUNK
LOCATION ZONE: FACE

## 2020-12-14 NOTE — PROCEDURE: ADDITIONAL NOTES
Detail Level: Simple
Additional Notes: Samples of cerave anti itch lotion and eucerin anti itch lotion.
Additional Notes: No signs of reoccurrence

## 2021-06-14 ENCOUNTER — APPOINTMENT (OUTPATIENT)
Dept: URBAN - METROPOLITAN AREA CLINIC 252 | Age: 71
Setting detail: DERMATOLOGY
End: 2021-06-17

## 2021-06-14 VITALS — HEIGHT: 72 IN | RESPIRATION RATE: 16 BRPM | WEIGHT: 152 LBS

## 2021-06-14 DIAGNOSIS — L57.0 ACTINIC KERATOSIS: ICD-10-CM

## 2021-06-14 DIAGNOSIS — L21.8 OTHER SEBORRHEIC DERMATITIS: ICD-10-CM

## 2021-06-14 DIAGNOSIS — L82.1 OTHER SEBORRHEIC KERATOSIS: ICD-10-CM

## 2021-06-14 DIAGNOSIS — Z85.828 PERSONAL HISTORY OF OTHER MALIGNANT NEOPLASM OF SKIN: ICD-10-CM

## 2021-06-14 PROCEDURE — 99213 OFFICE O/P EST LOW 20 MIN: CPT | Mod: 25

## 2021-06-14 PROCEDURE — OTHER COUNSELING: OTHER

## 2021-06-14 PROCEDURE — OTHER ADDITIONAL NOTES: OTHER

## 2021-06-14 PROCEDURE — OTHER LIQUID NITROGEN: OTHER

## 2021-06-14 PROCEDURE — 17000 DESTRUCT PREMALG LESION: CPT

## 2021-06-14 ASSESSMENT — LOCATION SIMPLE DESCRIPTION DERM
LOCATION SIMPLE: RIGHT CHEEK
LOCATION SIMPLE: RIGHT UPPER BACK
LOCATION SIMPLE: LEFT CHEEK
LOCATION SIMPLE: LEFT FOREHEAD
LOCATION SIMPLE: RIGHT FOREHEAD

## 2021-06-14 ASSESSMENT — LOCATION DETAILED DESCRIPTION DERM
LOCATION DETAILED: LEFT LATERAL FOREHEAD
LOCATION DETAILED: RIGHT LATERAL FOREHEAD
LOCATION DETAILED: LEFT INFERIOR MEDIAL MALAR CHEEK
LOCATION DETAILED: RIGHT SUPERIOR MEDIAL MALAR CHEEK
LOCATION DETAILED: RIGHT SUPERIOR UPPER BACK
LOCATION DETAILED: RIGHT INFERIOR MEDIAL MALAR CHEEK

## 2021-06-14 ASSESSMENT — LOCATION ZONE DERM
LOCATION ZONE: TRUNK
LOCATION ZONE: FACE

## 2021-06-14 NOTE — PROCEDURE: ADDITIONAL NOTES
Additional Notes: Recommended to try selsun blue shampoo.
Render Risk Assessment In Note?: no
Detail Level: Simple

## 2021-06-14 NOTE — PROCEDURE: COUNSELING
Patient Specific Counseling (Will Not Stick From Patient To Patient): *** if not resolved in one month instructed to return to clinic.
Detail Level: Detailed
Detail Level: Zone

## 2021-07-27 ENCOUNTER — HOSPITAL ENCOUNTER (OUTPATIENT)
Facility: CLINIC | Age: 71
End: 2021-07-27
Attending: INTERNAL MEDICINE | Admitting: INTERNAL MEDICINE
Payer: COMMERCIAL

## 2021-07-27 DIAGNOSIS — Z11.59 ENCOUNTER FOR SCREENING FOR OTHER VIRAL DISEASES: ICD-10-CM

## 2021-09-07 ENCOUNTER — TRANSITIONAL CARE UNIT VISIT (OUTPATIENT)
Dept: GERIATRICS | Facility: CLINIC | Age: 71
End: 2021-09-07
Payer: COMMERCIAL

## 2021-09-07 ENCOUNTER — LAB REQUISITION (OUTPATIENT)
Dept: LAB | Facility: CLINIC | Age: 71
End: 2021-09-07
Payer: COMMERCIAL

## 2021-09-07 VITALS
RESPIRATION RATE: 18 BRPM | BODY MASS INDEX: 18.77 KG/M2 | WEIGHT: 138.6 LBS | TEMPERATURE: 98.4 F | HEART RATE: 93 BPM | OXYGEN SATURATION: 97 % | SYSTOLIC BLOOD PRESSURE: 121 MMHG | HEIGHT: 72 IN | DIASTOLIC BLOOD PRESSURE: 65 MMHG

## 2021-09-07 DIAGNOSIS — G47.51 CONFUSIONAL AROUSALS: ICD-10-CM

## 2021-09-07 DIAGNOSIS — G20.A1 PARKINSON'S DISEASE (H): ICD-10-CM

## 2021-09-07 DIAGNOSIS — R29.6 FALLS FREQUENTLY: ICD-10-CM

## 2021-09-07 DIAGNOSIS — K52.9 NON-SPECIFIC COLITIS: ICD-10-CM

## 2021-09-07 DIAGNOSIS — H90.3 SENSORINEURAL HEARING LOSS, BILATERAL: Primary | ICD-10-CM

## 2021-09-07 DIAGNOSIS — R19.7 DIARRHEA, UNSPECIFIED TYPE: ICD-10-CM

## 2021-09-07 LAB
ALBUMIN UR-MCNC: NEGATIVE MG/DL
APPEARANCE UR: CLEAR
BILIRUB UR QL STRIP: NEGATIVE
COLOR UR AUTO: COLORLESS
GLUCOSE UR STRIP-MCNC: NEGATIVE MG/DL
HGB UR QL STRIP: NEGATIVE
KETONES UR STRIP-MCNC: NEGATIVE MG/DL
LEUKOCYTE ESTERASE UR QL STRIP: NEGATIVE
MUCOUS THREADS #/AREA URNS LPF: PRESENT /LPF
NITRATE UR QL: NEGATIVE
PH UR STRIP: 5.5 [PH] (ref 5–7)
RBC URINE: <1 /HPF
SP GR UR STRIP: 1.01 (ref 1–1.03)
UROBILINOGEN UR STRIP-MCNC: <2 MG/DL
WBC URINE: <1 /HPF

## 2021-09-07 PROCEDURE — 81001 URINALYSIS AUTO W/SCOPE: CPT | Mod: ORL

## 2021-09-07 PROCEDURE — 99305 1ST NF CARE MODERATE MDM 35: CPT | Performed by: FAMILY MEDICINE

## 2021-09-07 RX ORDER — BUDESONIDE 3 MG/1
3 CAPSULE, COATED PELLETS ORAL DAILY
COMMUNITY
Start: 2021-10-19 | End: 2021-11-09

## 2021-09-07 RX ORDER — BUDESONIDE 3 MG/1
6 CAPSULE, COATED PELLETS ORAL DAILY
COMMUNITY
Start: 2021-09-27 | End: 2021-10-18

## 2021-09-07 RX ORDER — HYDROXYZINE HYDROCHLORIDE 50 MG/1
50 TABLET, FILM COATED ORAL EVERY 6 HOURS PRN
COMMUNITY

## 2021-09-07 RX ORDER — BUDESONIDE 3 MG/1
9 CAPSULE, COATED PELLETS ORAL DAILY
COMMUNITY
Start: 2021-09-05 | End: 2021-09-26

## 2021-09-07 ASSESSMENT — MIFFLIN-ST. JEOR: SCORE: 1421.69

## 2021-09-07 NOTE — LETTER
9/7/2021        RE: Abraham Sinclair   [address]  [place] [address]        [facility] GERIATRIC SERVICES       Patient Abraham Sinclair Jr.  MRN: [id number]    [facility]    Reason for Visit     Chief Complaint   Patient presents with     Hospital F/U       Code Status     CPR/Full code     Assessment     Acute fall in TCU   Acute diarrhea secondary to lymphocytic colitis  Weight loss  Electrolyte abnormalities including low potassium  Severe malnutrition  Left wrist pain  Parkinson's disease  Generalized weakness    Plan     Pt is admitted to TCU for strengthening and rehab.  Patient presented with 6 weeks of diarrhea and weight loss.  He has lost 20 pounds.  Status post colonoscopy.  Biopsies showed lymphocytic colitis.  Based on GIs recommendation he is on oral budesonide on a slow taper  Outpatient follow-up with GI.  reporting improvement in stool ing frequency  Electrolyte abnormalities were corrected.  Oral intake to be monitored to prevent further weight loss.  Profound weakness noted due to which he has been discharged to the TCU   there is some concern for cognitive decline due to patient reporting confusion and disorientation at night   this will require monitoring with cognitive work-up.  He is also reporting similar concerns with more sedation and sleepiness during daytime and excessive somnolence witnessed during nighttime he is more awake and alert.  He is on melatonin.  I did talk to him about his medication and he does not think any of his scheduled medications could be activating for him.  He may need to discuss this with his primary neurologist.  Staff is also reporting increasing confusion.  Will await for cognitive testing on him.  We will check a UA UC to rule out any acute infection  Recheck labs  Continue with PT/OT-at baseline he is quite debilitated and requires a stand lift for transfers currently  Fell yesterday and no injury    History     Patient is a very pleasant 70  year old male who is admitted to TCU  Patient presented with severe diarrhea.  He was evaluated by GI and underwent EGD and colonoscopy.  Biopsy came back positive for lymphocytic colitis  He had significant electrolyte abnormalities including low potassium which were corrected.  He has underlying history of Parkinson's due to which it was felt he was quite weak.  Overall due to weakness and deconditioning he has been discharged to the TCU  Also reports 20lb wt loss    Past Medical & Surgical History     PAST MEDICAL HISTORY:   Past Medical History:   Diagnosis Date     Agatston coronary artery calcium score greater than 400 1/8/2019     Degenerative disc disease, cervical 1/8/2019     Hypothyroidism       PAST SURGICAL HISTORY:   has a past surgical history that includes hernia repair, inguinal rt/lt (02/2010); OPEN TREATMENT PROX HUMERAL FRACTURE (6/5/10); REMOVAL IMPLANT, SUPERFICIAL (7/16/2010); and Lasik (Bilateral, 2003).      Past Social History     Reviewed,  reports that he has never smoked. He has never used smokeless tobacco. He reports current alcohol use of about 2.5 standard drinks of alcohol per week. He reports that he does not use drugs.    Family History     Reviewed, and family history includes Breast Cancer in his paternal grandmother; Diabetes in his maternal grandmother; Heart Disease in his father.    Medication List   Post Discharge Medication Reconciliation Status: Post Discharge Medication Reconciliation Status: discharge medications reconciled, continue medications without change.  Current Outpatient Medications   Medication     alendronate (FOSAMAX) 70 MG tablet     aspirin 81 MG EC tablet     atorvastatin (LIPITOR) 80 MG tablet     carbidopa-levodopa (SINEMET)  MG tablet     cholecalciferol (VITAMIN D-1000 MAX ST) 25 MCG (1000 UT) TABS     FLUZONE HIGH-DOSE 0.5 ML injection     glycopyrrolate (ROBINUL) 1 MG tablet     levothyroxine (SYNTHROID/LEVOTHROID) 88 MCG tablet      moxifloxacin (VIGAMOX) 0.5 % ophthalmic solution     rasagiline (AZILECT) 1 MG TABS tablet     tamsulosin (FLOMAX) 0.4 MG capsule     vitamin E (TOCOPHEROL) 400 units (360 mg) capsule     No current facility-administered medications for this visit.       Allergies     No Known Allergies    Review of Systems   A comprehensive review of 14 systems was done. Pertinent findings noted here and in history of present illness. All the rest negative.  Constitutional: Negative.  Negative for fever, chills, he has  activity change, appetite change and fatigue.   HENT: Negative for congestion and facial swelling.    Eyes: Negative for photophobia, redness and visual disturbance.   Respiratory: Negative for cough and chest tightness.    Cardiovascular: Negative for chest pain, palpitations and leg swelling.   Gastrointestinal: Negative for nausea, diarrhea, constipation, blood in stool and abdominal distention.   Genitourinary: Negative.    Musculoskeletal: has falls and now transferring with a stand lift   Skin: Negative.    Neurological: Negative for dizziness, tremors, syncope, weakness, light-headedness and headaches.   Hematological: Does not bruise/bleed easily.   Psychiatric/Behavioral: Negative.  Recall is not very good  Does not sleep well at night and is up and sleeps during daytime      Physical Exam     Blood pressure 121/65, pulse 93, temperature 98.4  F (36.9  C), resp. rate 18, height 1.829 m (6'), weight 62.9 kg (138 lb 9.6 oz), SpO2 97 %.      Constitutional: Oriented to person, place, and appears well-developed.   Patient is frail  HEENT:  Normocephalic and atraumatic.  Eyes: Conjunctivae and EOM are normal. Pupils are equal, round, and reactive to light. No discharge.  No scleral icterus. Nose normal. Mouth/Throat: Oropharynx is clear and moist. No oropharyngeal exudate.    NECK: Normal range of motion. Neck supple. No JVD present. No tracheal deviation present. No thyromegaly present.   CARDIOVASCULAR: Normal  rate, regular rhythm and intact distal pulses.  Exam reveals no gallop and no friction rub.  Systolic murmur present.  PULMONARY: Effort normal and breath sounds normal. No respiratory distress.No Wheezing or rales.  ABDOMEN: Soft. Bowel sounds are normal. No distension and no mass.  There is no tenderness. There is no rebound and no guarding. No HSM.  MUSCULOSKELETAL: Normal range of motion. No edema and no tenderness. Mild kyphosis, no tenderness.  Does have some degree of foot drop he could not place his heels on the floor  LYMPH NODES: Has no cervical, supraclavicular, axillary and groin adenopathy.   NEUROLOGICAL: Alert and oriented to person, place,  No cranial nerve deficit.  Normal muscle tone. Coordination normal.   Needs a stand lift  GENITOURINARY: Deferred exam.  SKIN: Skin is warm and dry. No rash noted. No erythema. No pallor.   EXTREMITIES: No cyanosis, no clubbing, no edema. No Deformity.  PSYCHIATRIC: Normal mood, affect and behavior. RECALL IS LIIMITED      Lab Results     Recent Results (from the past 240 hour(s))   COVID-19 VIRUS (CORONAVIRUS) BY PCR (EXTERNAL RESULT)    Collection Time: 08/31/21  6:59 AM   Result Value Ref Range    COVID-19 Virus by PCR (External Result) Negative Negative   COVID-19 VIRUS (CORONAVIRUS) BY PCR (EXTERNAL RESULT)    Collection Time: 09/03/21  1:30 PM   Result Value Ref Range    COVID-19 Virus by PCR (External Result) Negative Negative             Electronically signed by  MIGUEL Stallworth                               Sincerely,        MIGUEL Stallworth

## 2021-09-07 NOTE — PROGRESS NOTES
[facility] GERIATRIC SERVICES       Patient Abraham Sinclair Jr.  MRN: [id number]    [facility]    Reason for Visit     Chief Complaint   Patient presents with     Hospital F/U       Code Status     CPR/Full code     Assessment     Acute fall in TCU   Acute diarrhea secondary to lymphocytic colitis  Weight loss  Electrolyte abnormalities including low potassium  Severe malnutrition  Left wrist pain  Parkinson's disease  Generalized weakness    Plan     Pt is admitted to TCU for strengthening and rehab.  Patient presented with 6 weeks of diarrhea and weight loss.  He has lost 20 pounds.  Status post colonoscopy.  Biopsies showed lymphocytic colitis.  Based on GIs recommendation he is on oral budesonide on a slow taper  Outpatient follow-up with GI.  reporting improvement in stool ing frequency  Electrolyte abnormalities were corrected.  Oral intake to be monitored to prevent further weight loss.  Profound weakness noted due to which he has been discharged to the TCU   there is some concern for cognitive decline due to patient reporting confusion and disorientation at night   this will require monitoring with cognitive work-up.  He is also reporting similar concerns with more sedation and sleepiness during daytime and excessive somnolence witnessed during nighttime he is more awake and alert.  He is on melatonin.  I did talk to him about his medication and he does not think any of his scheduled medications could be activating for him.  He may need to discuss this with his primary neurologist.  Staff is also reporting increasing confusion.  Will await for cognitive testing on him.  We will check a UA UC to rule out any acute infection  Recheck labs  Continue with PT/OT-at baseline he is quite debilitated and requires a stand lift for transfers currently  Fell yesterday and no injury    History     Patient is a very pleasant 70 year old male who is admitted to TCU  Patient presented with severe diarrhea.  He was  evaluated by GI and underwent EGD and colonoscopy.  Biopsy came back positive for lymphocytic colitis  He had significant electrolyte abnormalities including low potassium which were corrected.  He has underlying history of Parkinson's due to which it was felt he was quite weak.  Overall due to weakness and deconditioning he has been discharged to the TCU  Also reports 20lb wt loss    Past Medical & Surgical History     PAST MEDICAL HISTORY:   Past Medical History:   Diagnosis Date     Agatston coronary artery calcium score greater than 400 1/8/2019     Degenerative disc disease, cervical 1/8/2019     Hypothyroidism       PAST SURGICAL HISTORY:   has a past surgical history that includes hernia repair, inguinal rt/lt (02/2010); OPEN TREATMENT PROX HUMERAL FRACTURE (6/5/10); REMOVAL IMPLANT, SUPERFICIAL (7/16/2010); and Lasik (Bilateral, 2003).      Past Social History     Reviewed,  reports that he has never smoked. He has never used smokeless tobacco. He reports current alcohol use of about 2.5 standard drinks of alcohol per week. He reports that he does not use drugs.    Family History     Reviewed, and family history includes Breast Cancer in his paternal grandmother; Diabetes in his maternal grandmother; Heart Disease in his father.    Medication List   Post Discharge Medication Reconciliation Status: Post Discharge Medication Reconciliation Status: discharge medications reconciled, continue medications without change.  Current Outpatient Medications   Medication     alendronate (FOSAMAX) 70 MG tablet     aspirin 81 MG EC tablet     atorvastatin (LIPITOR) 80 MG tablet     carbidopa-levodopa (SINEMET)  MG tablet     cholecalciferol (VITAMIN D-1000 MAX ST) 25 MCG (1000 UT) TABS     FLUZONE HIGH-DOSE 0.5 ML injection     glycopyrrolate (ROBINUL) 1 MG tablet     levothyroxine (SYNTHROID/LEVOTHROID) 88 MCG tablet     moxifloxacin (VIGAMOX) 0.5 % ophthalmic solution     rasagiline (AZILECT) 1 MG TABS tablet      tamsulosin (FLOMAX) 0.4 MG capsule     vitamin E (TOCOPHEROL) 400 units (360 mg) capsule     No current facility-administered medications for this visit.       Allergies     No Known Allergies    Review of Systems   A comprehensive review of 14 systems was done. Pertinent findings noted here and in history of present illness. All the rest negative.  Constitutional: Negative.  Negative for fever, chills, he has  activity change, appetite change and fatigue.   HENT: Negative for congestion and facial swelling.    Eyes: Negative for photophobia, redness and visual disturbance.   Respiratory: Negative for cough and chest tightness.    Cardiovascular: Negative for chest pain, palpitations and leg swelling.   Gastrointestinal: Negative for nausea, diarrhea, constipation, blood in stool and abdominal distention.   Genitourinary: Negative.    Musculoskeletal: has falls and now transferring with a stand lift   Skin: Negative.    Neurological: Negative for dizziness, tremors, syncope, weakness, light-headedness and headaches.   Hematological: Does not bruise/bleed easily.   Psychiatric/Behavioral: Negative.  Recall is not very good  Does not sleep well at night and is up and sleeps during daytime      Physical Exam     Blood pressure 121/65, pulse 93, temperature 98.4  F (36.9  C), resp. rate 18, height 1.829 m (6'), weight 62.9 kg (138 lb 9.6 oz), SpO2 97 %.      Constitutional: Oriented to person, place, and appears well-developed.   Patient is frail  HEENT:  Normocephalic and atraumatic.  Eyes: Conjunctivae and EOM are normal. Pupils are equal, round, and reactive to light. No discharge.  No scleral icterus. Nose normal. Mouth/Throat: Oropharynx is clear and moist. No oropharyngeal exudate.    NECK: Normal range of motion. Neck supple. No JVD present. No tracheal deviation present. No thyromegaly present.   CARDIOVASCULAR: Normal rate, regular rhythm and intact distal pulses.  Exam reveals no gallop and no friction rub.   Systolic murmur present.  PULMONARY: Effort normal and breath sounds normal. No respiratory distress.No Wheezing or rales.  ABDOMEN: Soft. Bowel sounds are normal. No distension and no mass.  There is no tenderness. There is no rebound and no guarding. No HSM.  MUSCULOSKELETAL: Normal range of motion. No edema and no tenderness. Mild kyphosis, no tenderness.  Does have some degree of foot drop he could not place his heels on the floor  LYMPH NODES: Has no cervical, supraclavicular, axillary and groin adenopathy.   NEUROLOGICAL: Alert and oriented to person, place,  No cranial nerve deficit.  Normal muscle tone. Coordination normal.   Needs a stand lift  GENITOURINARY: Deferred exam.  SKIN: Skin is warm and dry. No rash noted. No erythema. No pallor.   EXTREMITIES: No cyanosis, no clubbing, no edema. No Deformity.  PSYCHIATRIC: Normal mood, affect and behavior. RECALL IS LIIMITED      Lab Results     Recent Results (from the past 240 hour(s))   COVID-19 VIRUS (CORONAVIRUS) BY PCR (EXTERNAL RESULT)    Collection Time: 08/31/21  6:59 AM   Result Value Ref Range    COVID-19 Virus by PCR (External Result) Negative Negative   COVID-19 VIRUS (CORONAVIRUS) BY PCR (EXTERNAL RESULT)    Collection Time: 09/03/21  1:30 PM   Result Value Ref Range    COVID-19 Virus by PCR (External Result) Negative Negative             Electronically signed by  MIGUEL Stallworth

## 2021-09-08 ENCOUNTER — LAB REQUISITION (OUTPATIENT)
Dept: LAB | Facility: CLINIC | Age: 71
End: 2021-09-08
Payer: COMMERCIAL

## 2021-09-08 DIAGNOSIS — N39.0 URINARY TRACT INFECTION, SITE NOT SPECIFIED: ICD-10-CM

## 2021-09-08 LAB
ALBUMIN UR-MCNC: NEGATIVE MG/DL
APPEARANCE UR: CLEAR
BILIRUB UR QL STRIP: NEGATIVE
COLOR UR AUTO: COLORLESS
GLUCOSE UR STRIP-MCNC: NEGATIVE MG/DL
HGB UR QL STRIP: NEGATIVE
KETONES UR STRIP-MCNC: NEGATIVE MG/DL
LEUKOCYTE ESTERASE UR QL STRIP: NEGATIVE
MUCOUS THREADS #/AREA URNS LPF: PRESENT /LPF
NITRATE UR QL: NEGATIVE
PH UR STRIP: 6.5 [PH] (ref 5–7)
RBC URINE: <1 /HPF
SP GR UR STRIP: 1.01 (ref 1–1.03)
UROBILINOGEN UR STRIP-MCNC: <2 MG/DL
WBC URINE: 0 /HPF

## 2021-09-08 PROCEDURE — 81001 URINALYSIS AUTO W/SCOPE: CPT | Mod: ORL | Performed by: FAMILY MEDICINE

## 2021-09-09 ENCOUNTER — TRANSITIONAL CARE UNIT VISIT (OUTPATIENT)
Dept: GERIATRICS | Facility: CLINIC | Age: 71
End: 2021-09-09
Payer: COMMERCIAL

## 2021-09-09 VITALS
OXYGEN SATURATION: 95 % | HEIGHT: 72 IN | DIASTOLIC BLOOD PRESSURE: 60 MMHG | SYSTOLIC BLOOD PRESSURE: 92 MMHG | TEMPERATURE: 98.8 F | WEIGHT: 138.6 LBS | RESPIRATION RATE: 18 BRPM | HEART RATE: 86 BPM | BODY MASS INDEX: 18.77 KG/M2

## 2021-09-09 DIAGNOSIS — K52.9 NON-SPECIFIC COLITIS: ICD-10-CM

## 2021-09-09 DIAGNOSIS — R19.7 DIARRHEA, UNSPECIFIED TYPE: ICD-10-CM

## 2021-09-09 DIAGNOSIS — G20.A1 PARKINSON'S DISEASE (H): ICD-10-CM

## 2021-09-09 DIAGNOSIS — R29.6 FALLS FREQUENTLY: Primary | ICD-10-CM

## 2021-09-09 PROCEDURE — 99310 SBSQ NF CARE HIGH MDM 45: CPT | Performed by: FAMILY MEDICINE

## 2021-09-09 ASSESSMENT — MIFFLIN-ST. JEOR: SCORE: 1421.69

## 2021-09-09 NOTE — PROGRESS NOTES
[facility] [place] [facility]ATRIC SERVICES       Patient Abraham Sinclair Jr.  MRN: [id number]    [facility]    Reason for Visit     Chief Complaint   Patient presents with     RECHECK   Follow-up MOOD /FALL    Code Status     CPR/Full code     Assessment     Acute fall in TCU   Acute psychosis with patient showing anger agitation and paranoia  Limited self-awareness with patient constantly self transferring.  Cognitive impairment slums is 14/30  Persistent hypotension with low blood pressures in the TCU  Acute diarrhea secondary to lymphocytic colitis  Weight loss  Electrolyte abnormalities including low potassium  Severe malnutrition  Left wrist pain  Parkinson's disease  Generalized weakness    Plan     Pt is admitted to TCU for strengthening and rehab.  Patient presented with 6 weeks of diarrhea and weight loss.  He has lost 20 pounds.  Status post colonoscopy.  Biopsies showed lymphocytic colitis.  Based on GIs recommendation he is on oral budesonide on a slow taper  Outpatient follow-up with GI.  reporting improvement in stool ing frequency  However now having significant escalation in mood and behaviors with increasing confusion noted.  Stat UA UC ordered however came back clean.  Staff reports the patient has become paranoid again aggressive.  He has been hitting out at his caregivers.  He is constantly disrobing and when I went to the room he had actually stripped himself down to his knees.  He was taking his diaper off and biting on his bed.  Nursing staff will actually try to help him and he eventually self transfers due to poor safety awareness and continues with the same behavior.  Sleep log reviewed and he is not sleeping at all.  Plan is to discontinue melatonin 1 mg.  Start him on melatonin 5 mg scheduled at bedtime.  Continue with his current hydroxyzine for anxiety.  Start him on trazodone 50 mg nightly as needed for insomnia.  Nursing will use it let me know if this is effective for him.  Add Seroquel 25 mg  every 6 hours as needed also for agitation and anxiety and paranoid thoughts and behaviors.  Recheck routine labs closely  Care plan also reviewed with nursing due to the fact that he is bedbound self transferring he needs a Broda chair to minimize his fall risk.  He should also be kept in a public area  Already and I did review his cognitive status current slums is 14/30.  In addition hypotension noted he was on midodrine so I suspect this is chronic orthostatic hypotension.  Medication review and he is not on any antihypertensive  Discontinue vitamin E due to polypharmacy concern  Speech evaluation for cognition.  Also for weight loss due to recent 20 pound weight loss.  Consider behavioral health placement if symptoms and behaviors continue to escalate.    History     Patient is a very pleasant 70 year old male who is admitted to TCU  Patient presented with severe diarrhea.  He was evaluated by GI and underwent EGD and colonoscopy.  Biopsy came back positive for lymphocytic colitis  He had significant electrolyte abnormalities including low potassium which were corrected.  He has underlying history of Parkinson's due to which it was felt he was quite weak.  Overall due to weakness and deconditioning he has been discharged to the TCU  Also reports 20lb wt loss  Unfortunately has had significant decline in cognitive function.  Recheck slums was 14/30.  He continues to be very paranoid and agitated and aggressive towards caregivers.  He is not sleeping at night he is up all day and night long and self transferring he has had a fall in the TCU    Past Medical & Surgical History     PAST MEDICAL HISTORY:   Past Medical History:   Diagnosis Date     [place] coronary artery calcium score greater than 400 1/8/2019     Degenerative disc disease, cervical 1/8/2019     Hypothyroidism       PAST SURGICAL HISTORY:   has a past surgical history that includes hernia repair, inguinal rt/lt (02/2010); OPEN TREATMENT PROX HUMERAL  FRACTURE (6/5/10); REMOVAL IMPLANT, SUPERFICIAL (7/16/2010); and Lasik (Bilateral, 2003).      Past Social History     Reviewed,  reports that he has never smoked. He has never used smokeless tobacco. He reports current alcohol use of about 2.5 standard drinks of alcohol per week. He reports that he does not use drugs.    Family History     Reviewed, and family history includes Breast Cancer in his paternal grandmother; Diabetes in his maternal grandmother; Heart Disease in his father.    Medication List   Post Discharge Medication Reconciliation Status: Post Discharge Medication Reconciliation Status: discharge medications reconciled, continue medications without change.  Current Outpatient Medications   Medication     budesonide (ENTOCORT EC) 3 MG EC capsule     [START ON 9/27/2021] budesonide (ENTOCORT EC) 3 MG EC capsule     [START ON 10/19/2021] budesonide (ENTOCORT EC) 3 MG EC capsule     calcium carbonate antacid 1000 MG CHEW     carbidopa-levodopa (SINEMET)  MG tablet     cholecalciferol (VITAMIN D-1000 MAX ST) 25 MCG (1000 UT) TABS     hydrOXYzine (ATARAX) 50 MG tablet     levothyroxine (SYNTHROID/LEVOTHROID) 50 MCG tablet     melatonin 1 MG TABS tablet     rasagiline (AZILECT) 1 MG TABS tablet     tamsulosin (FLOMAX) 0.4 MG capsule     vitamin E (TOCOPHEROL) 400 units (360 mg) capsule     No current facility-administered medications for this visit.       Allergies     No Known Allergies    Review of Systems   A comprehensive review of 14 systems was done. Pertinent findings noted here and in history of present illness. All the rest negative.  Constitutional: Negative.  Negative for fever, chills, he has  activity change, appetite change and fatigue.   HENT: Negative for congestion and facial swelling.    Eyes: Negative for photophobia, redness and visual disturbance.   Respiratory: Negative for cough and chest tightness.    Cardiovascular: Negative for chest pain, palpitations and leg swelling.    Gastrointestinal: Negative for nausea, diarrhea, constipation, blood in stool and abdominal distention.   Genitourinary: Negative.    Musculoskeletal: has falls and now transferring with a stand lift   Skin: Negative.    Neurological: Negative for dizziness, tremors, syncope, weakness, light-headedness and headaches.   Hematological: Does not bruise/bleed easily.   Psychiatric/Behavioral: Negative.  Recall is not very good  Does not sleep well at night and is up and sleeps during daytime  He has become very paranoid angry and aggressive with caregivers and physically aggressive with them and trying to hit them.  He is resistive to care constantly self transferring and had very poor self-awareness.  When I went to see him in the room he had disrobed himself and had removed his diaper he is incontinent  This appears to be repetitive behavior for him      Physical Exam     Blood pressure 92/60, pulse 86, temperature 98.8  F (37.1  C), resp. rate 18, height 1.829 m (6'), weight 62.9 kg (138 lb 9.6 oz), SpO2 95 %.      Constitutional: Oriented to person, place, and appears well-developed.   Patient is frail  HEENT:  Normocephalic and atraumatic.  Eyes: Conjunctivae and EOM are normal. Pupils are equal, round, and reactive to light. No discharge.  No scleral icterus. Nose normal. Mouth/Throat: Oropharynx is clear and moist. No oropharyngeal exudate.    NECK: Normal range of motion. Neck supple. No JVD present. No tracheal deviation present. No thyromegaly present.   CARDIOVASCULAR: Normal rate, regular rhythm and intact distal pulses.  Exam reveals no gallop and no friction rub.  Systolic murmur present.  PULMONARY: Effort normal and breath sounds normal. No respiratory distress.No Wheezing or rales.  ABDOMEN: Soft. Bowel sounds are normal. No distension and no mass.  There is no tenderness. There is no rebound and no guarding. No HSM.  MUSCULOSKELETAL: Normal range of motion. No edema and no tenderness. Mild kyphosis,  no tenderness.  Does have some degree of foot drop he could not place his heels on the floor  LYMPH NODES: Has no cervical, supraclavicular, axillary and groin adenopathy.   NEUROLOGICAL: Alert and oriented to person, place,  No cranial nerve deficit.  Normal muscle tone. Coordination normal.   Needs a stand lift  GENITOURINARY: Deferred exam.  SKIN: Skin is warm and dry. No rash noted. No erythema. No pallor.   EXTREMITIES: No cyanosis, no clubbing, no edema. No Deformity.  PSYCHIATRIC: Normal mood, affect and behavior. RECALL IS LIIMITED  Angry and agitated with caregivers      Lab Results     Recent Results (from the past 240 hour(s))   COVID-19 VIRUS (CORONAVIRUS) BY PCR (EXTERNAL RESULT)    Collection Time: 08/31/21  6:59 AM   Result Value Ref Range    COVID-19 Virus by PCR (External Result) Negative Negative   COVID-19 VIRUS (CORONAVIRUS) BY PCR (EXTERNAL RESULT)    Collection Time: 09/03/21  1:30 PM   Result Value Ref Range    COVID-19 Virus by PCR (External Result) Negative Negative   UA with Microscopic reflex to Culture    Collection Time: 09/08/21  3:00 AM    Specimen: Urine, Midstream   Result Value Ref Range    Color Urine Colorless Colorless, Straw, Light Yellow, Yellow    Appearance Urine Clear Clear    Glucose Urine Negative Negative mg/dL    Bilirubin Urine Negative Negative    Ketones Urine Negative Negative mg/dL    Specific Gravity Urine 1.008 1.001 - 1.030    Blood Urine Negative Negative    pH Urine 6.5 5.0 - 7.0    Protein Albumin Urine Negative Negative mg/dL    Urobilinogen Urine <2.0 <2.0 mg/dL    Nitrite Urine Negative Negative    Leukocyte Esterase Urine Negative Negative    Mucus Urine Present (A) None Seen /LPF    RBC Urine <1 <=2 /HPF    WBC Urine 0 <=5 /HPF             Electronically signed by  MIGUEL Stallworth

## 2021-09-09 NOTE — LETTER
9/9/2021        RE: Abraham Sinclair .  [address]  [place] [address]        [facility]       Patient Abraham Sinclair Jr.  MRN: [id number]    [facility]    Reason for Visit     Chief Complaint   Patient presents with     RECHECK   Follow-up MOOD /FALL    Code Status     CPR/Full code     Assessment     Acute fall in TCU   Acute psychosis with patient showing anger agitation and paranoia  Limited self-awareness with patient constantly self transferring.  Cognitive impairment slums is 14/30  Persistent hypotension with low blood pressures in the TCU  Acute diarrhea secondary to lymphocytic colitis  Weight loss  Electrolyte abnormalities including low potassium  Severe malnutrition  Left wrist pain  Parkinson's disease  Generalized weakness    Plan     Pt is admitted to TCU for strengthening and rehab.  Patient presented with 6 weeks of diarrhea and weight loss.  He has lost 20 pounds.  Status post colonoscopy.  Biopsies showed lymphocytic colitis.  Based on GIs recommendation he is on oral budesonide on a slow taper  Outpatient follow-up with GI.  reporting improvement in stool ing frequency  However now having significant escalation in mood and behaviors with increasing confusion noted.  Stat UA UC ordered however came back clean.  Staff reports the patient has become paranoid again aggressive.  He has been hitting out at his caregivers.  He is constantly disrobing and when I went to the room he had actually stripped himself down to his knees.  He was taking his diaper off and biting on his bed.  Nursing staff will actually try to help him and he eventually self transfers due to poor safety awareness and continues with the same behavior.  Sleep log reviewed and he is not sleeping at all.  Plan is to discontinue melatonin 1 mg.  Start him on melatonin 5 mg scheduled at bedtime.  Continue with his current hydroxyzine for anxiety.  Start him on trazodone 50 mg nightly as needed for  insomnia.  Nursing will use it let me know if this is effective for him.  Add Seroquel 25 mg every 6 hours as needed also for agitation and anxiety and paranoid thoughts and behaviors.  Recheck routine labs closely  Care plan also reviewed with nursing due to the fact that he is bedbound self transferring he needs a Broda chair to minimize his fall risk.  He should also be kept in a public area  Already and I did review his cognitive status current slums is 14/30.  In addition hypotension noted he was on midodrine so I suspect this is chronic orthostatic hypotension.  Medication review and he is not on any antihypertensive  Discontinue vitamin E due to polypharmacy concern  Speech evaluation for cognition.  Also for weight loss due to recent 20 pound weight loss.  Consider behavioral health placement if symptoms and behaviors continue to escalate.    History     Patient is a very pleasant 70 year old male who is admitted to TCU  Patient presented with severe diarrhea.  He was evaluated by GI and underwent EGD and colonoscopy.  Biopsy came back positive for lymphocytic colitis  He had significant electrolyte abnormalities including low potassium which were corrected.  He has underlying history of Parkinson's due to which it was felt he was quite weak.  Overall due to weakness and deconditioning he has been discharged to the TCU  Also reports 20lb wt loss  Unfortunately has had significant decline in cognitive function.  Recheck slums was 14/30.  He continues to be very paranoid and agitated and aggressive towards caregivers.  He is not sleeping at night he is up all day and night long and self transferring he has had a fall in the TCU    Past Medical & Surgical History     PAST MEDICAL HISTORY:   Past Medical History:   Diagnosis Date     [facility] coronary artery calcium score greater than 400 1/8/2019     Degenerative disc disease, cervical 1/8/2019     Hypothyroidism       PAST SURGICAL HISTORY:   has a past  surgical history that includes hernia repair, inguinal rt/lt (02/2010); OPEN TREATMENT PROX HUMERAL FRACTURE (6/5/10); REMOVAL IMPLANT, SUPERFICIAL (7/16/2010); and Lasik (Bilateral, 2003).      Past Social History     Reviewed,  reports that he has never smoked. He has never used smokeless tobacco. He reports current alcohol use of about 2.5 standard drinks of alcohol per week. He reports that he does not use drugs.    Family History     Reviewed, and family history includes Breast Cancer in his paternal grandmother; Diabetes in his maternal grandmother; Heart Disease in his father.    Medication List   Post Discharge Medication Reconciliation Status: Post Discharge Medication Reconciliation Status: discharge medications reconciled, continue medications without change.  Current Outpatient Medications   Medication     budesonide (ENTOCORT EC) 3 MG EC capsule     [START ON 9/27/2021] budesonide (ENTOCORT EC) 3 MG EC capsule     [START ON 10/19/2021] budesonide (ENTOCORT EC) 3 MG EC capsule     calcium carbonate antacid 1000 MG CHEW     carbidopa-levodopa (SINEMET)  MG tablet     cholecalciferol (VITAMIN D-1000 MAX ST) 25 MCG (1000 UT) TABS     hydrOXYzine (ATARAX) 50 MG tablet     levothyroxine (SYNTHROID/LEVOTHROID) 50 MCG tablet     melatonin 1 MG TABS tablet     rasagiline (AZILECT) 1 MG TABS tablet     tamsulosin (FLOMAX) 0.4 MG capsule     vitamin E (TOCOPHEROL) 400 units (360 mg) capsule     No current facility-administered medications for this visit.       Allergies     No Known Allergies    Review of Systems   A comprehensive review of 14 systems was done. Pertinent findings noted here and in history of present illness. All the rest negative.  Constitutional: Negative.  Negative for fever, chills, he has  activity change, appetite change and fatigue.   HENT: Negative for congestion and facial swelling.    Eyes: Negative for photophobia, redness and visual disturbance.   Respiratory: Negative for cough  and chest tightness.    Cardiovascular: Negative for chest pain, palpitations and leg swelling.   Gastrointestinal: Negative for nausea, diarrhea, constipation, blood in stool and abdominal distention.   Genitourinary: Negative.    Musculoskeletal: has falls and now transferring with a stand lift   Skin: Negative.    Neurological: Negative for dizziness, tremors, syncope, weakness, light-headedness and headaches.   Hematological: Does not bruise/bleed easily.   Psychiatric/Behavioral: Negative.  Recall is not very good  Does not sleep well at night and is up and sleeps during daytime  He has become very paranoid angry and aggressive with caregivers and physically aggressive with them and trying to hit them.  He is resistive to care constantly self transferring and had very poor self-awareness.  When I went to see him in the room he had disrobed himself and had removed his diaper he is incontinent  This appears to be repetitive behavior for him      Physical Exam     Blood pressure 92/60, pulse 86, temperature 98.8  F (37.1  C), resp. rate 18, height 1.829 m (6'), weight 62.9 kg (138 lb 9.6 oz), SpO2 95 %.      Constitutional: Oriented to person, place, and appears well-developed.   Patient is frail  HEENT:  Normocephalic and atraumatic.  Eyes: Conjunctivae and EOM are normal. Pupils are equal, round, and reactive to light. No discharge.  No scleral icterus. Nose normal. Mouth/Throat: Oropharynx is clear and moist. No oropharyngeal exudate.    NECK: Normal range of motion. Neck supple. No JVD present. No tracheal deviation present. No thyromegaly present.   CARDIOVASCULAR: Normal rate, regular rhythm and intact distal pulses.  Exam reveals no gallop and no friction rub.  Systolic murmur present.  PULMONARY: Effort normal and breath sounds normal. No respiratory distress.No Wheezing or rales.  ABDOMEN: Soft. Bowel sounds are normal. No distension and no mass.  There is no tenderness. There is no rebound and no  guarding. No HSM.  MUSCULOSKELETAL: Normal range of motion. No edema and no tenderness. Mild kyphosis, no tenderness.  Does have some degree of foot drop he could not place his heels on the floor  LYMPH NODES: Has no cervical, supraclavicular, axillary and groin adenopathy.   NEUROLOGICAL: Alert and oriented to person, place,  No cranial nerve deficit.  Normal muscle tone. Coordination normal.   Needs a stand lift  GENITOURINARY: Deferred exam.  SKIN: Skin is warm and dry. No rash noted. No erythema. No pallor.   EXTREMITIES: No cyanosis, no clubbing, no edema. No Deformity.  PSYCHIATRIC: Normal mood, affect and behavior. RECALL IS LIIMITED  Angry and agitated with caregivers      Lab Results     Recent Results (from the past 240 hour(s))   COVID-19 VIRUS (CORONAVIRUS) BY PCR (EXTERNAL RESULT)    Collection Time: 08/31/21  6:59 AM   Result Value Ref Range    COVID-19 Virus by PCR (External Result) Negative Negative   COVID-19 VIRUS (CORONAVIRUS) BY PCR (EXTERNAL RESULT)    Collection Time: 09/03/21  1:30 PM   Result Value Ref Range    COVID-19 Virus by PCR (External Result) Negative Negative   UA with Microscopic reflex to Culture    Collection Time: 09/08/21  3:00 AM    Specimen: Urine, Midstream   Result Value Ref Range    Color Urine Colorless Colorless, Straw, Light Yellow, Yellow    Appearance Urine Clear Clear    Glucose Urine Negative Negative mg/dL    Bilirubin Urine Negative Negative    Ketones Urine Negative Negative mg/dL    Specific Gravity Urine 1.008 1.001 - 1.030    Blood Urine Negative Negative    pH Urine 6.5 5.0 - 7.0    Protein Albumin Urine Negative Negative mg/dL    Urobilinogen Urine <2.0 <2.0 mg/dL    Nitrite Urine Negative Negative    Leukocyte Esterase Urine Negative Negative    Mucus Urine Present (A) None Seen /LPF    RBC Urine <1 <=2 /HPF    WBC Urine 0 <=5 /HPF             Electronically signed by  MIGUEL Stallworth                             Sincerely,        MIGUEL Stallworth

## 2021-09-13 ENCOUNTER — LAB REQUISITION (OUTPATIENT)
Dept: LAB | Facility: CLINIC | Age: 71
End: 2021-09-13
Payer: COMMERCIAL

## 2021-09-13 DIAGNOSIS — D64.9 ANEMIA, UNSPECIFIED: ICD-10-CM

## 2021-09-13 DIAGNOSIS — G20.A1 PARKINSON'S DISEASE (H): ICD-10-CM

## 2021-09-14 ENCOUNTER — TRANSFERRED RECORDS (OUTPATIENT)
Dept: HEALTH INFORMATION MANAGEMENT | Facility: CLINIC | Age: 71
End: 2021-09-14

## 2021-09-14 ENCOUNTER — LAB REQUISITION (OUTPATIENT)
Dept: LAB | Facility: CLINIC | Age: 71
End: 2021-09-14
Payer: COMMERCIAL

## 2021-09-14 DIAGNOSIS — Z20.828 CONTACT WITH AND (SUSPECTED) EXPOSURE TO OTHER VIRAL COMMUNICABLE DISEASES: ICD-10-CM

## 2021-09-14 LAB
ANION GAP SERPL CALCULATED.3IONS-SCNC: 10 MMOL/L (ref 5–18)
BUN SERPL-MCNC: 26 MG/DL (ref 8–28)
CALCIUM SERPL-MCNC: 9 MG/DL (ref 8.5–10.5)
CHLORIDE BLD-SCNC: 105 MMOL/L (ref 98–107)
CO2 SERPL-SCNC: 24 MMOL/L (ref 22–31)
CREAT SERPL-MCNC: 0.86 MG/DL (ref 0.7–1.3)
ERYTHROCYTE [DISTWIDTH] IN BLOOD BY AUTOMATED COUNT: 12.4 % (ref 10–15)
GFR SERPL CREATININE-BSD FRML MDRD: 87 ML/MIN/1.73M2
GLUCOSE BLD-MCNC: 87 MG/DL (ref 70–125)
HCT VFR BLD AUTO: 36.2 % (ref 40–53)
HGB BLD-MCNC: 12 G/DL (ref 13.3–17.7)
MAGNESIUM SERPL-MCNC: 1.8 MG/DL (ref 1.8–2.6)
MCH RBC QN AUTO: 31.7 PG (ref 26.5–33)
MCHC RBC AUTO-ENTMCNC: 33.1 G/DL (ref 31.5–36.5)
MCV RBC AUTO: 96 FL (ref 78–100)
PLATELET # BLD AUTO: 304 10E3/UL (ref 150–450)
POTASSIUM BLD-SCNC: 3.4 MMOL/L (ref 3.5–5)
RBC # BLD AUTO: 3.78 10E6/UL (ref 4.4–5.9)
SODIUM SERPL-SCNC: 139 MMOL/L (ref 136–145)
WBC # BLD AUTO: 8.7 10E3/UL (ref 4–11)

## 2021-09-14 PROCEDURE — 83735 ASSAY OF MAGNESIUM: CPT | Mod: ORL

## 2021-09-14 PROCEDURE — 80048 BASIC METABOLIC PNL TOTAL CA: CPT | Mod: ORL

## 2021-09-14 PROCEDURE — 85027 COMPLETE CBC AUTOMATED: CPT | Mod: ORL

## 2021-09-14 PROCEDURE — P9604 ONE-WAY ALLOW PRORATED TRIP: HCPCS | Mod: ORL

## 2021-09-14 PROCEDURE — 36415 COLL VENOUS BLD VENIPUNCTURE: CPT | Mod: ORL

## 2021-09-15 PROCEDURE — 36415 COLL VENOUS BLD VENIPUNCTURE: CPT | Mod: ORL | Performed by: FAMILY MEDICINE

## 2021-09-15 PROCEDURE — P9604 ONE-WAY ALLOW PRORATED TRIP: HCPCS | Mod: ORL | Performed by: FAMILY MEDICINE

## 2021-09-15 PROCEDURE — 86481 TB AG RESPONSE T-CELL SUSP: CPT | Mod: ORL | Performed by: FAMILY MEDICINE

## 2021-09-16 LAB
GAMMA INTERFERON BACKGROUND BLD IA-ACNC: 0.06 IU/ML
M TB IFN-G BLD-IMP: NEGATIVE
M TB IFN-G CD4+ BCKGRND COR BLD-ACNC: 1.68 IU/ML
MITOGEN IGNF BCKGRD COR BLD-ACNC: -0.01 IU/ML
MITOGEN IGNF BCKGRD COR BLD-ACNC: 0 IU/ML
QUANTIFERON MITOGEN: 1.74 IU/ML
QUANTIFERON NIL TUBE: 0.06 IU/ML
QUANTIFERON TB1 TUBE: 0.05 IU/ML
QUANTIFERON TB2 TUBE: 0.06

## 2021-09-20 ENCOUNTER — TELEPHONE (OUTPATIENT)
Dept: GERIATRICS | Facility: CLINIC | Age: 71
End: 2021-09-20

## 2021-09-20 RX ORDER — QUETIAPINE FUMARATE 25 MG/1
25 TABLET, FILM COATED ORAL EVERY 6 HOURS PRN
COMMUNITY
Start: 2021-09-20

## 2021-09-20 NOTE — TELEPHONE ENCOUNTER
[facility] Nurse Triage Telephone Note    Provider: Kristen Vivar MD  Facility: [facility] Type:  TCU    Caller: Jennifer  Call Back Number: [phone]    No Known Allergies    Reason for call: Pt's Seroquel PRN was discontinued on 9/14/21 by another NP. Since then pt has had increased hallucinations, agitation, screaming/yelling, aggression. Previous order was for Seroquel 25mg q6hrs PRN for agitation, anxiety and paranoid thoughts/behaviors written on 9/9/21. Requesting renewal of medication.    Verbal Order/Direction given by Provider: Start Seroquel 25mg PO q6hrs PRN for agitation and hallucinations    Provider giving Order:  LORENA Gamez    Verbal Order given to: Jennifer Jeffrey RN

## 2021-09-23 ENCOUNTER — TELEPHONE (OUTPATIENT)
Dept: GERIATRICS | Facility: CLINIC | Age: 71
End: 2021-09-23

## 2021-09-23 ENCOUNTER — TRANSITIONAL CARE UNIT VISIT (OUTPATIENT)
Dept: GERIATRICS | Facility: CLINIC | Age: 71
End: 2021-09-23
Payer: COMMERCIAL

## 2021-09-23 VITALS
HEART RATE: 76 BPM | TEMPERATURE: 97.2 F | BODY MASS INDEX: 18.31 KG/M2 | SYSTOLIC BLOOD PRESSURE: 108 MMHG | OXYGEN SATURATION: 98 % | RESPIRATION RATE: 15 BRPM | DIASTOLIC BLOOD PRESSURE: 73 MMHG | HEIGHT: 72 IN | WEIGHT: 135.2 LBS

## 2021-09-23 DIAGNOSIS — Z91.199 FAILURE TO ATTEND APPOINTMENT: Primary | ICD-10-CM

## 2021-09-23 ASSESSMENT — MIFFLIN-ST. JEOR: SCORE: 1401.26

## 2021-09-23 NOTE — TELEPHONE ENCOUNTER
[facility] Nurse Triage Telephone Note    Provider: Kristen Vivar MD  Facility: [facility]  Facility Type:  TCU    Caller: Rebecca   Call Back Number: [phone]    Allergies:  No Known Allergies     Reason for call: Pt this am was very anxious wanting breakfast and when nursing went in to check on the pt for rounds the pt was non-responsive, pupils fixed, pt only blinking eyes, pt had a very large loose stool. Vitals: BP:  108/73  P:: 76  R:: 15  SPO2: 98% R/A Temp.:  97.2        [facility] STANDING ORDER GIVEN:  Decreased level of consciousness, send to ER    Provider giving Order:  Ginger Hoskins MD    Verbal Order given to: Rebecca Pérez RN

## 2021-09-23 NOTE — LETTER
9/23/2021        RE: Abraham Sinclair Jr.  [address] [place] [address]          This encounter was opened in error. Please disregard.        Sincerely,        Ginger Hoskins MD

## 2021-09-28 ENCOUNTER — LAB REQUISITION (OUTPATIENT)
Dept: LAB | Facility: CLINIC | Age: 71
End: 2021-09-28
Payer: COMMERCIAL

## 2021-09-28 ENCOUNTER — TRANSITIONAL CARE UNIT VISIT (OUTPATIENT)
Dept: GERIATRICS | Facility: CLINIC | Age: 71
End: 2021-09-28
Payer: COMMERCIAL

## 2021-09-28 VITALS
RESPIRATION RATE: 16 BRPM | DIASTOLIC BLOOD PRESSURE: 50 MMHG | HEART RATE: 74 BPM | HEIGHT: 72 IN | WEIGHT: 135.2 LBS | BODY MASS INDEX: 18.31 KG/M2 | SYSTOLIC BLOOD PRESSURE: 93 MMHG | OXYGEN SATURATION: 100 % | TEMPERATURE: 97.7 F

## 2021-09-28 DIAGNOSIS — R19.7 DIARRHEA, UNSPECIFIED TYPE: ICD-10-CM

## 2021-09-28 DIAGNOSIS — G20.A1 PARKINSON'S DISEASE (H): Primary | ICD-10-CM

## 2021-09-28 DIAGNOSIS — E87.6 HYPOKALEMIA: ICD-10-CM

## 2021-09-28 PROCEDURE — 99316 NF DSCHRG MGMT 30 MIN+: CPT | Performed by: NURSE PRACTITIONER

## 2021-09-28 RX ORDER — TRAZODONE HYDROCHLORIDE 50 MG/1
50 TABLET, FILM COATED ORAL
COMMUNITY

## 2021-09-28 ASSESSMENT — MIFFLIN-ST. JEOR: SCORE: 1401.26

## 2021-09-28 NOTE — PROGRESS NOTES
[facility] Ger[facility]    Chief Complaint   Patient presents with     RECHECK     Discharge Summary Nursing Home     [place] Medical Record Number:  [id number]  Place of Service where encounter took place:  [facility] ([facility]) [[id number]]  Code Status:  No Order     HISTORY:      HPI:  Abraham Sinclair Jr.  is 71 year old (1950) undergoing physical and occupational therapy. He is  with a history of Parkinson's disease who was admitted on 8/31/2021 after presenting to the [facility] for evaluation of diarrhea for about 6 weeks associated with weight loss .Patient says he has lost roughly 20 lbs since that time.     He was admitted and was seen by GI. They recommended Colonoscopy and patient had this during admission and the biopsies has come back with lymphocytic colitis. GI has started him on oral Budenoside and they will follow up outpatient     Today he was seen to review vital signs and a face-to-face for discharge.  He will discharge to [facility] on Wednesday, 9/29/2021 with current medications and treatments.  He will be signing on  to [facility].  He denied any chest pain shortness of breath cough or congestion.  He denied any discomfort or pain.  He was noted to have a low potassium of 3.4 and this will be checked prior to his discharge.    ALLERGIES:Patient has no known allergies.    PAST MEDICAL HISTORY:   Past Medical History:   Diagnosis Date     Agatston coronary artery calcium score greater than 400 1/8/2019     Degenerative disc disease, cervical 1/8/2019     Hypothyroidism        PAST SURGICAL HISTORY:   has a past surgical history that includes hernia repair, inguinal rt/lt (02/2010); OPEN TREATMENT PROX HUMERAL FRACTURE (6/5/10); REMOVAL IMPLANT, SUPERFICIAL (7/16/2010); and Lasik (Bilateral, 2003).    FAMILY HISTORY: family history includes Breast Cancer in his paternal grandmother; Diabetes in his maternal grandmother; Heart Disease in his father.    SOCIAL HISTORY:   reports that he has never smoked. He has never used smokeless tobacco. He reports current alcohol use of about 2.5 standard drinks of alcohol per week. He reports that he does not use drugs.    ROS:  Constitutional: Negative for activity change, appetite change, fatigue and fever.   HENT: Negative for congestion.    Respiratory: Negative for cough, shortness of breath and wheezing.    Cardiovascular: Negative for chest pain and leg swelling.   Gastrointestinal: Negative for abdominal distention, abdominal pain, constipation,  positive for diarrhea   Genitourinary: Negative for dysuria.   Musculoskeletal: Negative for arthralgia. Negative for back pain.   Skin: Negative for color change and wound.   Neurological: Negative for dizziness.   Psychiatric/Behavioral: Negative for agitation, behavioral problems and confusion.     Physical Exam:  Constitutional:       Appearance: Patient is well-developed.   HENT:      Head: Normocephalic.   Eyes:      Conjunctiva/sclera: Conjunctivae normal.   Neck:      Musculoskeletal: Normal range of motion.   Cardiovascular:      Rate and Rhythm: Normal rate and regular rhythm.      Heart sounds: Normal heart sounds. No murmur.   Pulmonary:      Effort: No respiratory distress.      Breath sounds: Normal breath sounds. No wheezing or rales.   Abdominal:      General: Bowel sounds are normal. There is no distension.      Palpations: Abdomen is soft.      Tenderness: There is no abdominal tenderness.   Musculoskeletal:       Normal range of motion.     Skin:General:        Skin is warm.   Neurological:         Mental Status: Patient is alert and oriented to person, place, and time.   Psychiatric:         Behavior: Behavior normal.     Vitals:  BP 93/50   Pulse 74   Temp 97.7  F (36.5  C)   Resp 16   Ht 1.829 m (6')   Wt 61.3 kg (135 lb 3.2 oz)   SpO2 100%   BMI 18.34 kg/m    Body mass index is 18.34 kg/m .      MEDICATIONS:     Review of your medicines          Accurate as of  September 28, 2021  4:39 PM. If you have any questions, ask your nurse or doctor.            CONTINUE these medicines which have NOT CHANGED      Dose / Directions   * budesonide 3 MG EC capsule  Commonly known as: ENTOCORT EC      Dose: 6 mg  Take 6 mg by mouth daily For 3 weeks  Refills: 0     * budesonide 3 MG EC capsule  Commonly known as: ENTOCORT EC      Dose: 3 mg  Start taking on: October 19, 2021  Take 3 mg by mouth daily For 3 weeks  Refills: 0     calcium carbonate antacid 1000 MG Chew      Dose: 1,000 mg  Take 1,000 mg by mouth daily  Refills: 0     carbidopa-levodopa  MG tablet  Commonly known as: SINEMET      Dose: 1 tablet  Take 1 tablet by mouth 3 times daily  Refills: 3     hydrOXYzine 50 MG tablet  Commonly known as: ATARAX      Dose: 50 mg  Take 50 mg by mouth every 6 hours as needed for anxiety  Refills: 0     levothyroxine 50 MCG tablet  Commonly known as: SYNTHROID/LEVOTHROID      Dose: 50 mcg  Take 50 mcg by mouth daily  Refills: 0     melatonin 1 MG Tabs tablet      Dose: 5 mg  Take 5 mg by mouth At Bedtime  Refills: 0     QUEtiapine 25 MG tablet  Commonly known as: SEROquel      Dose: 25 mg  Take 25 mg by mouth every 6 hours as needed for agitation or hallucinations  Refills: 0     rasagiline 1 MG Tabs tablet  Commonly known as: AZILECT      Dose: 0.5 mg  Take 0.5 mg by mouth daily  Refills: 2     tamsulosin 0.4 MG capsule  Commonly known as: FLOMAX      Dose: 0.4 mg  Take 0.4 mg by mouth daily  Refills: 0     traZODone 50 MG tablet  Commonly known as: DESYREL      Dose: 50 mg  Take 50 mg by mouth nightly as needed for sleep  Refills: 0     Vitamin D-1000 Max St 25 MCG (1000 UT) Tabs  Generic drug: cholecalciferol      Dose: 2,000 Units  Take 2,000 Units by mouth daily  Refills: 0         * This list has 2 medication(s) that are the same as other medications prescribed for you. Read the directions carefully, and ask your doctor or other care provider to review them with you.             STOP taking    vitamin E 400 units (180 mg) capsule  Commonly known as: TOCOPHEROL  Stopped by: Brenna Velásquez CNP                DISCHARGE DIAGNOSIS:  Encounter Diagnoses   Name Primary?     Parkinson's disease (H) Yes     Diarrhea, unspecified type        MEDICAL EQUIPMENT NEEDS:  None needed    DISCHARGE PLAN/FACE TO FACE:  I certify that services are/were furnished while this patient was under the care of a physician and that a physician or an allowed non-physician practitioner (NPP), had a face-to-face encounter that meets the physician face-to-face encounter requirements. The encounter was in whole, or in part, related to the primary reason for home health. The patient is confined to his/her home and needs intermittent skilled nursing, physical therapy, speech-language pathology, or the continued need for occupational therapy. A plan of care has been established by a physician and is periodically reviewed by a physician.  Date of Face-to-Face Encounter: 9/28/2021    I certify that, based on my findings, the following services are medically necessary home health services: Patient will be signing out to [facility]    My clinical findings support the need for the above skilled services because: Patient will be signing on to [facility]      This patient is homebound because: Not applicable patient will be signing onto [facility]    The patient is, or has been, under my care and I have initiated the establishment of the plan of care. This patient will be followed by a physician who will periodically review the plan of care.    Schedule follow up visit with primary care provider within 7 days to reestablish care.    Electronically signed by: Brenna Velásquez CNP

## 2021-09-28 NOTE — LETTER
9/28/2021        RE: Abraham Sinclair Jr.  [address]  [place] [address]        [facility]    Chief Complaint   Patient presents with     RECHECK     Discharge Summary Nursing [facility] Medical Record Number:  [id number]  Place of Service where encounter took place:  [facility] ([facility]) [[id number]]  Code Status:  No Order     HISTORY:      HPI:  Abraham Sinclair Jr.  is 71 year old (1950) undergoing physical and occupational therapy. He is  with a history of Parkinson's disease who was admitted on 8/31/2021 after presenting to the [facility] for evaluation of diarrhea for about 6 weeks associated with weight loss .Patient says he has lost roughly 20 lbs since that time.     He was admitted and was seen by GI. They recommended Colonoscopy and patient had this during admission and the biopsies has come back with lymphocytic colitis. GI has started him on oral Budenoside and they will follow up outpatient     Today he was seen to review vital signs and a face-to-face for discharge.  [facility], 9[facility]29/2021 with current medications and treatments.  He will be signing on  to [facility].  He denied any chest pain shortness of breath cough or congestion.  He denied any discomfort or pain.  He was noted to have a low potassium of 3.4 and this will be checked prior to his discharge.    ALLERGIES:Patient has no known allergies.    PAST MEDICAL HISTORY:   Past Medical History:   Diagnosis Date     Agatston coronary artery calcium score greater than 400 1/8/2019     Degenerative disc disease, cervical 1/8/2019     Hypothyroidism        PAST SURGICAL HISTORY:   has a past surgical history that includes hernia repair, inguinal rt/lt (02/2010); OPEN TREATMENT PROX HUMERAL FRACTURE (6/5/10); REMOVAL IMPLANT, SUPERFICIAL (7/16/2010); and Lasik (Bilateral, 2003).    FAMILY HISTORY: family history includes Breast Cancer in his paternal  grandmother; Diabetes in his maternal grandmother; Heart Disease in his father.    SOCIAL HISTORY:  reports that he has never smoked. He has never used smokeless tobacco. He reports current alcohol use of about 2.5 standard drinks of alcohol per week. He reports that he does not use drugs.    ROS:  Constitutional: Negative for activity change, appetite change, fatigue and fever.   HENT: Negative for congestion.    Respiratory: Negative for cough, shortness of breath and wheezing.    Cardiovascular: Negative for chest pain and leg swelling.   Gastrointestinal: Negative for abdominal distention, abdominal pain, constipation,  positive for diarrhea   Genitourinary: Negative for dysuria.   Musculoskeletal: Negative for arthralgia. Negative for back pain.   Skin: Negative for color change and wound.   Neurological: Negative for dizziness.   Psychiatric/Behavioral: Negative for agitation, behavioral problems and confusion.     Physical Exam:  Constitutional:       Appearance: Patient is well-developed.   HENT:      Head: Normocephalic.   Eyes:      Conjunctiva/sclera: Conjunctivae normal.   Neck:      Musculoskeletal: Normal range of motion.   Cardiovascular:      Rate and Rhythm: Normal rate and regular rhythm.      Heart sounds: Normal heart sounds. No murmur.   Pulmonary:      Effort: No respiratory distress.      Breath sounds: Normal breath sounds. No wheezing or rales.   Abdominal:      General: Bowel sounds are normal. There is no distension.      Palpations: Abdomen is soft.      Tenderness: There is no abdominal tenderness.   Musculoskeletal:       Normal range of motion.     Skin:General:        Skin is warm.   Neurological:         Mental Status: Patient is alert and oriented to person, place, and time.   Psychiatric:         Behavior: Behavior normal.     Vitals:  BP 93/50   Pulse 74   Temp 97.7  F (36.5  C)   Resp 16   Ht 1.829 m (6')   Wt 61.3 kg (135 lb 3.2 oz)   SpO2 100%   BMI 18.34 kg/m    Body  mass index is 18.34 kg/m .      MEDICATIONS:     Review of your medicines          Accurate as of September 28, 2021  4:39 PM. If you have any questions, ask your nurse or doctor.            CONTINUE these medicines which have NOT CHANGED      Dose / Directions   * budesonide 3 MG EC capsule  Commonly known as: ENTOCORT EC      Dose: 6 mg  Take 6 mg by mouth daily For 3 weeks  Refills: 0     * budesonide 3 MG EC capsule  Commonly known as: ENTOCORT EC      Dose: 3 mg  Start taking on: October 19, 2021  Take 3 mg by mouth daily For 3 weeks  Refills: 0     calcium carbonate antacid 1000 MG Chew      Dose: 1,000 mg  Take 1,000 mg by mouth daily  Refills: 0     carbidopa-levodopa  MG tablet  Commonly known as: SINEMET      Dose: 1 tablet  Take 1 tablet by mouth 3 times daily  Refills: 3     hydrOXYzine 50 MG tablet  Commonly known as: ATARAX      Dose: 50 mg  Take 50 mg by mouth every 6 hours as needed for anxiety  Refills: 0     levothyroxine 50 MCG tablet  Commonly known as: SYNTHROID/LEVOTHROID      Dose: 50 mcg  Take 50 mcg by mouth daily  Refills: 0     melatonin 1 MG Tabs tablet      Dose: 5 mg  Take 5 mg by mouth At Bedtime  Refills: 0     QUEtiapine 25 MG tablet  Commonly known as: SEROquel      Dose: 25 mg  Take 25 mg by mouth every 6 hours as needed for agitation or hallucinations  Refills: 0     rasagiline 1 MG Tabs tablet  Commonly known as: AZILECT      Dose: 0.5 mg  Take 0.5 mg by mouth daily  Refills: 2     tamsulosin 0.4 MG capsule  Commonly known as: FLOMAX      Dose: 0.4 mg  Take 0.4 mg by mouth daily  Refills: 0     traZODone 50 MG tablet  Commonly known as: DESYREL      Dose: 50 mg  Take 50 mg by mouth nightly as needed for sleep  Refills: 0     Vitamin D-1000 Max St 25 MCG (1000 UT) Tabs  Generic drug: cholecalciferol      Dose: 2,000 Units  Take 2,000 Units by mouth daily  Refills: 0         * This list has 2 medication(s) that are the same as other medications prescribed for you. Read the  directions carefully, and ask your doctor or other care provider to review them with you.            STOP taking    vitamin E 400 units (180 mg) capsule  Commonly known as: TOCOPHEROL  Stopped by: Brenna Velásquez CNP                DISCHARGE DIAGNOSIS:  Encounter Diagnoses   Name Primary?     Parkinson's disease (H) Yes     Diarrhea, unspecified type        MEDICAL EQUIPMENT NEEDS:  None needed    DISCHARGE PLAN/FACE TO FACE:  I certify that services are/were furnished while this patient was under the care of a physician and that a physician or an allowed non-physician practitioner (NPP), had a face-to-face encounter that meets the physician face-to-face encounter requirements. The encounter was in whole, or in part, related to the primary reason for home health. The patient is confined to his/her home and needs intermittent skilled nursing, physical therapy, speech-language pathology, or the continued need for occupational therapy. A plan of care has been established by a physician and is periodically reviewed by a physician.  Date of Face-to-Face Encounter: 9/28/2021    I certify that, based on my findings, the following services are medically necessary home health services: Patient will be signing out to [facility]    My clinical findings support the need for the above skilled services because: Patient will be signing on to [facility]      This patient is homebound because: Not applicable patient will be signing onto [facility]    The patient is, or has been, under my care and I have initiated the establishment of the plan of care. This patient will be followed by a physician who will periodically review the plan of care.    Schedule follow up visit with primary care provider within 7 days to reestablish care.    Electronically signed by: Brenna Velásquez CNP        Sincerely,        Brenna Velásquez CNP

## 2021-09-29 ENCOUNTER — TELEPHONE (OUTPATIENT)
Dept: GERIATRICS | Facility: CLINIC | Age: 71
End: 2021-09-29

## 2021-09-29 LAB — POTASSIUM BLD-SCNC: 3.9 MMOL/L (ref 3.5–5)

## 2021-09-29 PROCEDURE — 84132 ASSAY OF SERUM POTASSIUM: CPT | Mod: ORL | Performed by: NURSE PRACTITIONER

## 2021-09-29 PROCEDURE — P9604 ONE-WAY ALLOW PRORATED TRIP: HCPCS | Mod: ORL | Performed by: NURSE PRACTITIONER

## 2021-09-29 PROCEDURE — 36415 COLL VENOUS BLD VENIPUNCTURE: CPT | Mod: ORL | Performed by: NURSE PRACTITIONER

## 2021-09-29 NOTE — TELEPHONE ENCOUNTER
[facility] Nurse Triage Telephone Note    Provider: LORENA Gamez  Facility: [facility]  Facility Type:  TCU    Caller: Hortencia  Call Back Number: [phone]    Allergies:  No Known Allergies     Reason for call: Nurse calling to report potassium level.      Verbal Order/Direction given by Provider: No new orders.      Provider giving Order:  LORENA Gamez    Verbal Order given to: Hortencia Lam RN

## 2022-11-14 ENCOUNTER — LAB REQUISITION (OUTPATIENT)
Dept: LAB | Facility: CLINIC | Age: 72
End: 2022-11-14
Payer: COMMERCIAL

## 2022-11-14 DIAGNOSIS — F03.90 UNSPECIFIED DEMENTIA, UNSPECIFIED SEVERITY, WITHOUT BEHAVIORAL DISTURBANCE, PSYCHOTIC DISTURBANCE, MOOD DISTURBANCE, AND ANXIETY (H): ICD-10-CM

## 2022-11-14 DIAGNOSIS — E03.9 HYPOTHYROIDISM, UNSPECIFIED: ICD-10-CM

## 2022-11-16 LAB
ANION GAP SERPL CALCULATED.3IONS-SCNC: 12 MMOL/L (ref 7–15)
BUN SERPL-MCNC: 22 MG/DL (ref 8–23)
CALCIUM SERPL-MCNC: 9.4 MG/DL (ref 8.8–10.2)
CHLORIDE SERPL-SCNC: 102 MMOL/L (ref 98–107)
CREAT SERPL-MCNC: 0.95 MG/DL (ref 0.67–1.17)
DEPRECATED HCO3 PLAS-SCNC: 23 MMOL/L (ref 22–29)
ERYTHROCYTE [DISTWIDTH] IN BLOOD BY AUTOMATED COUNT: 12.3 % (ref 10–15)
GFR SERPL CREATININE-BSD FRML MDRD: 85 ML/MIN/1.73M2
GLUCOSE SERPL-MCNC: 113 MG/DL (ref 70–99)
HCT VFR BLD AUTO: 40.8 % (ref 40–53)
HGB BLD-MCNC: 13.5 G/DL (ref 13.3–17.7)
MCH RBC QN AUTO: 31.8 PG (ref 26.5–33)
MCHC RBC AUTO-ENTMCNC: 33.1 G/DL (ref 31.5–36.5)
MCV RBC AUTO: 96 FL (ref 78–100)
PLATELET # BLD AUTO: 245 10E3/UL (ref 150–450)
POTASSIUM SERPL-SCNC: 3.9 MMOL/L (ref 3.4–5.3)
RBC # BLD AUTO: 4.25 10E6/UL (ref 4.4–5.9)
SODIUM SERPL-SCNC: 137 MMOL/L (ref 136–145)
TSH SERPL DL<=0.005 MIU/L-ACNC: 0.92 UIU/ML (ref 0.3–4.2)
WBC # BLD AUTO: 8.1 10E3/UL (ref 4–11)

## 2022-11-16 PROCEDURE — 84443 ASSAY THYROID STIM HORMONE: CPT | Mod: ORL | Performed by: PHYSICIAN ASSISTANT

## 2022-11-16 PROCEDURE — 80048 BASIC METABOLIC PNL TOTAL CA: CPT | Mod: ORL | Performed by: PHYSICIAN ASSISTANT

## 2022-11-16 PROCEDURE — 36415 COLL VENOUS BLD VENIPUNCTURE: CPT | Mod: ORL | Performed by: PHYSICIAN ASSISTANT

## 2022-11-16 PROCEDURE — P9604 ONE-WAY ALLOW PRORATED TRIP: HCPCS | Mod: ORL | Performed by: PHYSICIAN ASSISTANT

## 2022-11-16 PROCEDURE — 85027 COMPLETE CBC AUTOMATED: CPT | Mod: ORL | Performed by: PHYSICIAN ASSISTANT

## 2023-01-01 ENCOUNTER — LAB REQUISITION (OUTPATIENT)
Dept: LAB | Facility: CLINIC | Age: 73
End: 2023-01-01
Payer: COMMERCIAL

## 2023-01-01 DIAGNOSIS — E03.9 HYPOTHYROIDISM, UNSPECIFIED: ICD-10-CM

## 2023-01-01 DIAGNOSIS — F03.90 UNSPECIFIED DEMENTIA, UNSPECIFIED SEVERITY, WITHOUT BEHAVIORAL DISTURBANCE, PSYCHOTIC DISTURBANCE, MOOD DISTURBANCE, AND ANXIETY (H): ICD-10-CM

## 2023-01-01 LAB
ANION GAP SERPL CALCULATED.3IONS-SCNC: 13 MMOL/L (ref 7–15)
BUN SERPL-MCNC: 15.9 MG/DL (ref 8–23)
CALCIUM SERPL-MCNC: 9.4 MG/DL (ref 8.8–10.2)
CHLORIDE SERPL-SCNC: 104 MMOL/L (ref 98–107)
CREAT SERPL-MCNC: 0.95 MG/DL (ref 0.67–1.17)
DEPRECATED HCO3 PLAS-SCNC: 24 MMOL/L (ref 22–29)
EGFRCR SERPLBLD CKD-EPI 2021: 84 ML/MIN/1.73M2
ERYTHROCYTE [DISTWIDTH] IN BLOOD BY AUTOMATED COUNT: 12.8 % (ref 10–15)
GLUCOSE SERPL-MCNC: 72 MG/DL (ref 70–99)
HCT VFR BLD AUTO: 44 % (ref 40–53)
HGB BLD-MCNC: 14.4 G/DL (ref 13.3–17.7)
MCH RBC QN AUTO: 32.4 PG (ref 26.5–33)
MCHC RBC AUTO-ENTMCNC: 32.7 G/DL (ref 31.5–36.5)
MCV RBC AUTO: 99 FL (ref 78–100)
PLATELET # BLD AUTO: 107 10E3/UL (ref 150–450)
POTASSIUM SERPL-SCNC: 4.6 MMOL/L (ref 3.4–5.3)
RBC # BLD AUTO: 4.45 10E6/UL (ref 4.4–5.9)
SODIUM SERPL-SCNC: 141 MMOL/L (ref 135–145)
TSH SERPL DL<=0.005 MIU/L-ACNC: 0.68 UIU/ML (ref 0.3–4.2)
WBC # BLD AUTO: 9.5 10E3/UL (ref 4–11)

## 2023-01-01 PROCEDURE — 84443 ASSAY THYROID STIM HORMONE: CPT | Mod: ORL | Performed by: PHYSICIAN ASSISTANT

## 2023-01-01 PROCEDURE — 80048 BASIC METABOLIC PNL TOTAL CA: CPT | Mod: ORL | Performed by: PHYSICIAN ASSISTANT

## 2023-01-01 PROCEDURE — 85027 COMPLETE CBC AUTOMATED: CPT | Mod: ORL | Performed by: PHYSICIAN ASSISTANT

## 2023-01-01 PROCEDURE — 36415 COLL VENOUS BLD VENIPUNCTURE: CPT | Mod: ORL | Performed by: PHYSICIAN ASSISTANT

## 2023-01-01 PROCEDURE — P9604 ONE-WAY ALLOW PRORATED TRIP: HCPCS | Mod: ORL | Performed by: PHYSICIAN ASSISTANT

## 2023-06-13 ENCOUNTER — LAB REQUISITION (OUTPATIENT)
Dept: LAB | Facility: CLINIC | Age: 73
End: 2023-06-13
Payer: COMMERCIAL

## 2023-06-13 DIAGNOSIS — E03.9 HYPOTHYROIDISM, UNSPECIFIED: ICD-10-CM

## 2023-06-13 DIAGNOSIS — F03.90 UNSPECIFIED DEMENTIA, UNSPECIFIED SEVERITY, WITHOUT BEHAVIORAL DISTURBANCE, PSYCHOTIC DISTURBANCE, MOOD DISTURBANCE, AND ANXIETY (H): ICD-10-CM

## 2023-06-14 LAB
ANION GAP SERPL CALCULATED.3IONS-SCNC: 13 MMOL/L (ref 7–15)
BUN SERPL-MCNC: 13.8 MG/DL (ref 8–23)
CALCIUM SERPL-MCNC: 9.2 MG/DL (ref 8.8–10.2)
CHLORIDE SERPL-SCNC: 102 MMOL/L (ref 98–107)
CREAT SERPL-MCNC: 0.94 MG/DL (ref 0.67–1.17)
DEPRECATED HCO3 PLAS-SCNC: 23 MMOL/L (ref 22–29)
ERYTHROCYTE [DISTWIDTH] IN BLOOD BY AUTOMATED COUNT: 12.8 % (ref 10–15)
GFR SERPL CREATININE-BSD FRML MDRD: 86 ML/MIN/1.73M2
GLUCOSE SERPL-MCNC: 95 MG/DL (ref 70–99)
HCT VFR BLD AUTO: 42.1 % (ref 40–53)
HGB BLD-MCNC: 13.4 G/DL (ref 13.3–17.7)
MCH RBC QN AUTO: 31.4 PG (ref 26.5–33)
MCHC RBC AUTO-ENTMCNC: 31.8 G/DL (ref 31.5–36.5)
MCV RBC AUTO: 99 FL (ref 78–100)
PLATELET # BLD AUTO: 216 10E3/UL (ref 150–450)
POTASSIUM SERPL-SCNC: 3.9 MMOL/L (ref 3.4–5.3)
RBC # BLD AUTO: 4.27 10E6/UL (ref 4.4–5.9)
SODIUM SERPL-SCNC: 138 MMOL/L (ref 136–145)
TSH SERPL DL<=0.005 MIU/L-ACNC: 0.9 UIU/ML (ref 0.3–4.2)
WBC # BLD AUTO: 9.9 10E3/UL (ref 4–11)

## 2023-06-14 PROCEDURE — 85027 COMPLETE CBC AUTOMATED: CPT | Mod: ORL | Performed by: PHYSICIAN ASSISTANT

## 2023-06-14 PROCEDURE — 36415 COLL VENOUS BLD VENIPUNCTURE: CPT | Mod: ORL | Performed by: PHYSICIAN ASSISTANT

## 2023-06-14 PROCEDURE — 84443 ASSAY THYROID STIM HORMONE: CPT | Mod: ORL | Performed by: PHYSICIAN ASSISTANT

## 2023-06-14 PROCEDURE — P9604 ONE-WAY ALLOW PRORATED TRIP: HCPCS | Mod: ORL | Performed by: PHYSICIAN ASSISTANT

## 2023-06-14 PROCEDURE — 80048 BASIC METABOLIC PNL TOTAL CA: CPT | Mod: ORL | Performed by: PHYSICIAN ASSISTANT

## 2024-01-01 ENCOUNTER — LAB REQUISITION (OUTPATIENT)
Dept: LAB | Facility: CLINIC | Age: 74
End: 2024-01-01
Payer: COMMERCIAL

## 2024-01-01 DIAGNOSIS — E03.9 HYPOTHYROIDISM, UNSPECIFIED: ICD-10-CM

## 2024-01-01 DIAGNOSIS — I10 ESSENTIAL (PRIMARY) HYPERTENSION: ICD-10-CM

## 2024-01-01 LAB
ANION GAP SERPL CALCULATED.3IONS-SCNC: 11 MMOL/L (ref 7–15)
BUN SERPL-MCNC: 25.8 MG/DL (ref 8–23)
CALCIUM SERPL-MCNC: 9.3 MG/DL (ref 8.8–10.2)
CHLORIDE SERPL-SCNC: 104 MMOL/L (ref 98–107)
CREAT SERPL-MCNC: 1.52 MG/DL (ref 0.67–1.17)
DEPRECATED HCO3 PLAS-SCNC: 24 MMOL/L (ref 22–29)
EGFRCR SERPLBLD CKD-EPI 2021: 48 ML/MIN/1.73M2
ERYTHROCYTE [DISTWIDTH] IN BLOOD BY AUTOMATED COUNT: 13.3 % (ref 10–15)
GLUCOSE SERPL-MCNC: 104 MG/DL (ref 70–99)
HCT VFR BLD AUTO: 36.6 % (ref 40–53)
HGB BLD-MCNC: 12.2 G/DL (ref 13.3–17.7)
MCH RBC QN AUTO: 31.9 PG (ref 26.5–33)
MCHC RBC AUTO-ENTMCNC: 33.3 G/DL (ref 31.5–36.5)
MCV RBC AUTO: 96 FL (ref 78–100)
PLATELET # BLD AUTO: 220 10E3/UL (ref 150–450)
POTASSIUM SERPL-SCNC: 4.2 MMOL/L (ref 3.4–5.3)
RBC # BLD AUTO: 3.83 10E6/UL (ref 4.4–5.9)
SODIUM SERPL-SCNC: 139 MMOL/L (ref 135–145)
TSH SERPL DL<=0.005 MIU/L-ACNC: 0.63 UIU/ML (ref 0.3–4.2)
WBC # BLD AUTO: 7.6 10E3/UL (ref 4–11)

## 2024-01-01 PROCEDURE — 36415 COLL VENOUS BLD VENIPUNCTURE: CPT | Mod: ORL | Performed by: PHYSICIAN ASSISTANT

## 2024-01-01 PROCEDURE — 84443 ASSAY THYROID STIM HORMONE: CPT | Mod: ORL | Performed by: PHYSICIAN ASSISTANT

## 2024-01-01 PROCEDURE — 85027 COMPLETE CBC AUTOMATED: CPT | Mod: ORL | Performed by: PHYSICIAN ASSISTANT

## 2024-01-01 PROCEDURE — P9604 ONE-WAY ALLOW PRORATED TRIP: HCPCS | Mod: ORL | Performed by: PHYSICIAN ASSISTANT

## 2024-01-01 PROCEDURE — 80048 BASIC METABOLIC PNL TOTAL CA: CPT | Mod: ORL | Performed by: PHYSICIAN ASSISTANT

## 2025-03-08 NOTE — PROCEDURE: MOHS SURGERY
Never Mauc Instructions: By selecting yes to the question below the MAUC number will be added into the note.  This will be calculated automatically based on the diagnosis chosen, the size entered, the body zone selected (H,M,L) and the specific indications you chose. You will also have the option to override the Mohs AUC if you disagree with the automatically calculated number and this option is found in the Case Summary tab.
